# Patient Record
Sex: MALE | Race: WHITE | NOT HISPANIC OR LATINO | Employment: OTHER | URBAN - METROPOLITAN AREA
[De-identification: names, ages, dates, MRNs, and addresses within clinical notes are randomized per-mention and may not be internally consistent; named-entity substitution may affect disease eponyms.]

---

## 2024-01-30 ENCOUNTER — OFFICE VISIT (OUTPATIENT)
Dept: PRIMARY CARE | Facility: CLINIC | Age: 69
End: 2024-01-30
Payer: MEDICARE

## 2024-01-30 VITALS
HEART RATE: 61 BPM | SYSTOLIC BLOOD PRESSURE: 138 MMHG | OXYGEN SATURATION: 96 % | DIASTOLIC BLOOD PRESSURE: 70 MMHG | BODY MASS INDEX: 29.92 KG/M2 | WEIGHT: 202 LBS | HEIGHT: 69 IN | TEMPERATURE: 98 F

## 2024-01-30 DIAGNOSIS — I63.9 CEREBROVASCULAR ACCIDENT (CVA), UNSPECIFIED MECHANISM (MULTI): Primary | ICD-10-CM

## 2024-01-30 DIAGNOSIS — H35.30 MACULAR DEGENERATION, UNSPECIFIED LATERALITY, UNSPECIFIED TYPE: ICD-10-CM

## 2024-01-30 DIAGNOSIS — J43.2 CENTRILOBULAR EMPHYSEMA (MULTI): ICD-10-CM

## 2024-01-30 DIAGNOSIS — I48.91 ATRIAL FIBRILLATION, UNSPECIFIED TYPE (MULTI): ICD-10-CM

## 2024-01-30 DIAGNOSIS — H35.3230 BILATERAL EXUDATIVE AGE-RELATED MACULAR DEGENERATION, UNSPECIFIED STAGE (MULTI): ICD-10-CM

## 2024-01-30 DIAGNOSIS — C61 PROSTATE CANCER (MULTI): ICD-10-CM

## 2024-01-30 DIAGNOSIS — E66.9 OBESITY (BMI 30-39.9): ICD-10-CM

## 2024-01-30 DIAGNOSIS — E78.2 MIXED HYPERLIPIDEMIA: ICD-10-CM

## 2024-01-30 DIAGNOSIS — R30.9 URINARY PAIN: ICD-10-CM

## 2024-01-30 DIAGNOSIS — N40.1 BPH ASSOCIATED WITH NOCTURIA: ICD-10-CM

## 2024-01-30 DIAGNOSIS — K21.9 GASTROESOPHAGEAL REFLUX DISEASE WITHOUT ESOPHAGITIS: ICD-10-CM

## 2024-01-30 DIAGNOSIS — R35.1 BPH ASSOCIATED WITH NOCTURIA: ICD-10-CM

## 2024-01-30 PROCEDURE — 1036F TOBACCO NON-USER: CPT | Performed by: FAMILY MEDICINE

## 2024-01-30 PROCEDURE — 1159F MED LIST DOCD IN RCRD: CPT | Performed by: FAMILY MEDICINE

## 2024-01-30 PROCEDURE — 99213 OFFICE O/P EST LOW 20 MIN: CPT | Performed by: FAMILY MEDICINE

## 2024-01-30 RX ORDER — ATORVASTATIN CALCIUM 20 MG/1
20 TABLET, FILM COATED ORAL DAILY
COMMUNITY
Start: 2024-01-24

## 2024-01-30 RX ORDER — PHENAZOPYRIDINE HYDROCHLORIDE 100 MG/1
100 TABLET, FILM COATED ORAL 3 TIMES DAILY
COMMUNITY
Start: 2024-01-08 | End: 2024-01-30 | Stop reason: WASHOUT

## 2024-01-30 RX ORDER — NAPROXEN 500 MG/1
500 TABLET ORAL 2 TIMES DAILY PRN
Qty: 60 TABLET | Refills: 0 | Status: SHIPPED | OUTPATIENT
Start: 2024-01-30 | End: 2024-09-26

## 2024-01-30 RX ORDER — IPRATROPIUM BROMIDE AND ALBUTEROL SULFATE 2.5; .5 MG/3ML; MG/3ML
3 SOLUTION RESPIRATORY (INHALATION)
COMMUNITY
Start: 2022-10-28

## 2024-01-30 RX ORDER — POTASSIUM CHLORIDE 1500 MG/1
20 TABLET, EXTENDED RELEASE ORAL DAILY
COMMUNITY
Start: 2024-01-24

## 2024-01-30 RX ORDER — TAMSULOSIN HYDROCHLORIDE 0.4 MG/1
0.4 CAPSULE ORAL NIGHTLY
COMMUNITY
Start: 2023-10-02

## 2024-01-30 RX ORDER — BENZONATATE 100 MG/1
100 CAPSULE ORAL 3 TIMES DAILY PRN
COMMUNITY
Start: 2023-12-07 | End: 2024-01-30 | Stop reason: WASHOUT

## 2024-01-30 RX ORDER — TORSEMIDE 20 MG/1
40 TABLET ORAL
COMMUNITY
Start: 2024-01-24

## 2024-01-30 RX ORDER — PANTOPRAZOLE SODIUM 40 MG/1
40 TABLET, DELAYED RELEASE ORAL DAILY
COMMUNITY
End: 2024-01-31 | Stop reason: SDUPTHER

## 2024-01-30 RX ORDER — IPRATROPIUM BROMIDE 0.5 MG/2.5ML
SOLUTION RESPIRATORY (INHALATION)
COMMUNITY
Start: 2023-08-07

## 2024-01-30 RX ORDER — FINASTERIDE 5 MG/1
5 TABLET, FILM COATED ORAL DAILY
COMMUNITY
Start: 2024-01-24

## 2024-01-30 RX ORDER — TRAMADOL HYDROCHLORIDE 50 MG/1
50 TABLET ORAL 2 TIMES DAILY
Qty: 10 TABLET | Refills: 0 | Status: SHIPPED | OUTPATIENT
Start: 2024-01-30 | End: 2024-02-04

## 2024-01-30 RX ORDER — NAPROXEN SODIUM 220 MG/1
81 TABLET, FILM COATED ORAL DAILY
Qty: 30 TABLET | Refills: 11 | Status: SHIPPED | OUTPATIENT
Start: 2024-01-30 | End: 2025-01-29

## 2024-01-30 RX ORDER — BRIMONIDINE TARTRATE 2 MG/ML
1 SOLUTION/ DROPS OPHTHALMIC 2 TIMES DAILY
COMMUNITY
Start: 2023-03-08

## 2024-01-30 RX ORDER — ALBUTEROL SULFATE 90 UG/1
AEROSOL, METERED RESPIRATORY (INHALATION)
COMMUNITY

## 2024-01-30 RX ORDER — TRAMADOL HYDROCHLORIDE 50 MG/1
50 TABLET ORAL 2 TIMES DAILY
COMMUNITY
Start: 2023-05-11 | End: 2024-01-30 | Stop reason: SDUPTHER

## 2024-01-30 RX ORDER — ALBUTEROL SULFATE 0.83 MG/ML
SOLUTION RESPIRATORY (INHALATION)
COMMUNITY
Start: 2023-08-07

## 2024-01-30 NOTE — PROGRESS NOTES
Med refills   Establish care   Many Medical concerns   Macular degeneration     Camcevi injection q 6 months   Eligard injections q 6 months   On oxygen

## 2024-01-30 NOTE — PATIENT INSTRUCTIONS

## 2024-01-30 NOTE — PROGRESS NOTES
Patient was identified as a fall risk. Risk prevention instructions provided.    Patient is here to establish.  He did not care for his last PCP.  He had prostate cancer and has been having radiation.  Has been having pain and change in his stream.  He was given a bunch of different medications and is still frustrated.  He was given a long time ago some tramadol when it is really bad he will take 1 of those but has not really used too much as he does not like to.  He is on oxygen and otherwise doing well.    REVIEW OF SYSTEMS: 12 systems negative except for those mentioned in the HPI.    PHYSICAL EXAMINATION:   Constitutional: The patient is alert, in no acute  distress.  Eyes: Extraocular movements are intact.   ENT: external ear canals patent  Neck: no  JVD.  Heart: no JVD  Lungs: Normal respiration without stridor or nasal flaring   Psychiatric: Good judgment and insight. Normal affect and mood.  Skin: no rashes or lesions    Assessment:  per EMR    Plan:  OARRS reviewed appropriate.  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  Will go on doxycycline can also use naproxen.  Will have for breakthrough discomfort tramadol.  Will then follow-up with urology and talk about possible scope or other procedure.  Follow-up in 6 months for annual wellness    This dictation was created using Dragon dictation and may contain errors

## 2024-01-31 DIAGNOSIS — K21.9 GASTROESOPHAGEAL REFLUX DISEASE WITHOUT ESOPHAGITIS: Primary | ICD-10-CM

## 2024-01-31 RX ORDER — PANTOPRAZOLE SODIUM 40 MG/1
40 TABLET, DELAYED RELEASE ORAL DAILY
Qty: 90 TABLET | Refills: 1 | Status: SHIPPED | OUTPATIENT
Start: 2024-01-31

## 2024-06-10 ENCOUNTER — TELEPHONE (OUTPATIENT)
Dept: PRIMARY CARE | Facility: CLINIC | Age: 69
End: 2024-06-10
Payer: MEDICARE

## 2024-06-10 DIAGNOSIS — C61 PROSTATE CANCER (MULTI): Primary | ICD-10-CM

## 2024-06-10 NOTE — TELEPHONE ENCOUNTER
[facility] said they would like a hospice order faxed to them at [phone], did not see any referrals placed in chart

## 2024-06-24 ENCOUNTER — TELEPHONE (OUTPATIENT)
Dept: PRIMARY CARE | Facility: CLINIC | Age: 69
End: 2024-06-24
Payer: MEDICARE

## 2024-06-24 NOTE — TELEPHONE ENCOUNTER
Patient is asking for a refill on tramadol because he is in pain 7-8 pain scale. His surgeon will not send him anything.

## 2024-07-03 DIAGNOSIS — E78.2 MIXED HYPERLIPIDEMIA: Primary | ICD-10-CM

## 2024-07-03 RX ORDER — ATORVASTATIN CALCIUM 20 MG/1
20 TABLET, FILM COATED ORAL DAILY
Qty: 90 TABLET | Refills: 0 | Status: SHIPPED | OUTPATIENT
Start: 2024-07-03

## 2024-07-30 ENCOUNTER — APPOINTMENT (OUTPATIENT)
Dept: PRIMARY CARE | Facility: CLINIC | Age: 69
End: 2024-07-30
Payer: MEDICARE

## 2024-08-05 DIAGNOSIS — K21.9 GASTROESOPHAGEAL REFLUX DISEASE WITHOUT ESOPHAGITIS: ICD-10-CM

## 2024-08-05 RX ORDER — PANTOPRAZOLE SODIUM 40 MG/1
40 TABLET, DELAYED RELEASE ORAL DAILY
Qty: 90 TABLET | Refills: 0 | Status: SHIPPED | OUTPATIENT
Start: 2024-08-05

## 2024-09-06 ENCOUNTER — PATIENT OUTREACH (OUTPATIENT)
Dept: CARE COORDINATION | Facility: CLINIC | Age: 69
End: 2024-09-06
Payer: MEDICARE

## 2024-09-06 NOTE — PROGRESS NOTES
Date: 09/06/24    Dear Myles Nixon    Our records indicate that you are due for the following appointment or test: Annual Wellness Visit      Please call our office at your earliest convenience. We can assist you with scheduling an appointment or test.  If you have already scheduled an appointment or have completed testing, please disregard this letter.    Sincerely,    Svetlana Lee    [facility]  [address] [place] [address]    Office Phone: [phone]  Patient Navigator: [phone]

## 2024-09-09 ENCOUNTER — APPOINTMENT (OUTPATIENT)
Dept: PRIMARY CARE | Facility: CLINIC | Age: 69
End: 2024-09-09
Payer: MEDICARE

## 2024-10-01 ENCOUNTER — TELEPHONE (OUTPATIENT)
Dept: CARDIOLOGY | Facility: CLINIC | Age: 69
End: 2024-10-01
Payer: MEDICARE

## 2024-10-01 DIAGNOSIS — C61 PROSTATE CANCER (MULTI): Primary | ICD-10-CM

## 2024-10-01 DIAGNOSIS — I63.9 CEREBROVASCULAR ACCIDENT (CVA), UNSPECIFIED MECHANISM (MULTI): ICD-10-CM

## 2024-10-15 DIAGNOSIS — E78.2 MIXED HYPERLIPIDEMIA: ICD-10-CM

## 2024-10-15 RX ORDER — ATORVASTATIN CALCIUM 20 MG/1
20 TABLET, FILM COATED ORAL DAILY
Qty: 90 TABLET | Refills: 0 | Status: SHIPPED | OUTPATIENT
Start: 2024-10-15

## 2024-11-05 DIAGNOSIS — K21.9 GASTROESOPHAGEAL REFLUX DISEASE WITHOUT ESOPHAGITIS: ICD-10-CM

## 2024-11-05 RX ORDER — PANTOPRAZOLE SODIUM 40 MG/1
40 TABLET, DELAYED RELEASE ORAL DAILY
Qty: 90 TABLET | Refills: 0 | Status: SHIPPED | OUTPATIENT
Start: 2024-11-05

## 2024-12-30 LAB
NON-UH HIE ALLEN TEST: ABNORMAL
NON-UH HIE BASE EXCESS: -0.5 MMOL/L
NON-UH HIE EPAP: 0 CMH20
NON-UH HIE FIO2: 36 %
NON-UH HIE HCO3: 24.7 MMOL/L (ref 22–26)
NON-UH HIE IPAP: 0 CMH20
NON-UH HIE O2 L/M: 4
NON-UH HIE O2 SATURATION: 94.4 %
NON-UH HIE PCO2: 42.6 MMHG (ref 35–45)
NON-UH HIE PEEP: 0 CMH20
NON-UH HIE PH: 7.38 (ref 7.35–7.45)
NON-UH HIE PO2/FIO2 RATIO: 205 (ref 300–500)
NON-UH HIE PO2: 73.7 MMHG (ref 80–100)
NON-UH HIE PRESSURE SUPPORT.: 0 CMH20
NON-UH HIE RATE: 0 BPM
NON-UH HIE TEMP: 37 DEGC
NON-UH HIE TYPE OF SPECIMEN: ABNORMAL
NON-UH HIE VENTILATION: ABNORMAL
NON-UH HIE VT: 0 ML

## 2025-02-12 ENCOUNTER — LAB (OUTPATIENT)
Dept: LAB | Facility: HOSPITAL | Age: 70
End: 2025-02-12
Payer: MEDICARE

## 2025-02-12 ENCOUNTER — OFFICE VISIT (OUTPATIENT)
Dept: SURGERY | Facility: HOSPITAL | Age: 70
End: 2025-02-12
Payer: MEDICARE

## 2025-02-12 VITALS
WEIGHT: 228.3 LBS | TEMPERATURE: 97.3 F | DIASTOLIC BLOOD PRESSURE: 85 MMHG | BODY MASS INDEX: 34.21 KG/M2 | SYSTOLIC BLOOD PRESSURE: 165 MMHG | RESPIRATION RATE: 18 BRPM | OXYGEN SATURATION: 93 % | HEART RATE: 102 BPM

## 2025-02-12 DIAGNOSIS — R91.1 LUNG NODULE: ICD-10-CM

## 2025-02-12 DIAGNOSIS — Z01.818 PRE-PROCEDURAL EXAMINATION: ICD-10-CM

## 2025-02-12 LAB
ANION GAP SERPL CALC-SCNC: 11 MMOL/L (ref 10–20)
BUN SERPL-MCNC: 15 MG/DL (ref 6–23)
CALCIUM SERPL-MCNC: 9.6 MG/DL (ref 8.6–10.3)
CHLORIDE SERPL-SCNC: 100 MMOL/L (ref 98–107)
CO2 SERPL-SCNC: 34 MMOL/L (ref 21–32)
CREAT SERPL-MCNC: 0.76 MG/DL (ref 0.5–1.3)
EGFRCR SERPLBLD CKD-EPI 2021: >90 ML/MIN/1.73M*2
ERYTHROCYTE [DISTWIDTH] IN BLOOD BY AUTOMATED COUNT: 14 % (ref 11.5–14.5)
GLUCOSE SERPL-MCNC: 203 MG/DL (ref 74–99)
HCT VFR BLD AUTO: 39.4 % (ref 41–52)
HGB BLD-MCNC: 12.9 G/DL (ref 13.5–17.5)
MCH RBC QN AUTO: 29.5 PG (ref 26–34)
MCHC RBC AUTO-ENTMCNC: 32.7 G/DL (ref 32–36)
MCV RBC AUTO: 90 FL (ref 80–100)
NRBC BLD-RTO: 0 /100 WBCS (ref 0–0)
PLATELET # BLD AUTO: 239 X10*3/UL (ref 150–450)
POTASSIUM SERPL-SCNC: 3.8 MMOL/L (ref 3.5–5.3)
RBC # BLD AUTO: 4.37 X10*6/UL (ref 4.5–5.9)
SODIUM SERPL-SCNC: 141 MMOL/L (ref 136–145)
WBC # BLD AUTO: 6.3 X10*3/UL (ref 4.4–11.3)

## 2025-02-12 PROCEDURE — 80048 BASIC METABOLIC PNL TOTAL CA: CPT | Performed by: THORACIC SURGERY (CARDIOTHORACIC VASCULAR SURGERY)

## 2025-02-12 PROCEDURE — 1126F AMNT PAIN NOTED NONE PRSNT: CPT | Performed by: THORACIC SURGERY (CARDIOTHORACIC VASCULAR SURGERY)

## 2025-02-12 PROCEDURE — 99215 OFFICE O/P EST HI 40 MIN: CPT | Performed by: THORACIC SURGERY (CARDIOTHORACIC VASCULAR SURGERY)

## 2025-02-12 PROCEDURE — 36415 COLL VENOUS BLD VENIPUNCTURE: CPT | Performed by: THORACIC SURGERY (CARDIOTHORACIC VASCULAR SURGERY)

## 2025-02-12 PROCEDURE — 99205 OFFICE O/P NEW HI 60 MIN: CPT | Performed by: THORACIC SURGERY (CARDIOTHORACIC VASCULAR SURGERY)

## 2025-02-12 PROCEDURE — 85027 COMPLETE CBC AUTOMATED: CPT | Performed by: THORACIC SURGERY (CARDIOTHORACIC VASCULAR SURGERY)

## 2025-02-12 ASSESSMENT — PAIN SCALES - GENERAL: PAINLEVEL_OUTOF10: 0-NO PAIN

## 2025-02-12 NOTE — PROGRESS NOTES
Bronchoscopy Scheduling Request    Pre-bronchoscopy visit: New patient visit with Bronchoscopy group provider  Please schedule procedure: Next available    Cytology on-site:  Yes  Location:  [facility]  Performing physician:  Advanced diagnostic bronchoscopist  Referring physician:  Sincere Song MD, Eliseo Garcia DO  Indication:  PET avid 12 mm RUL nodule; severe COPD  Sedation / Anesthesia:  GA  Procedure:  TBNA, TBBx, Navigational bronchoscopy, Radial EBUS, Staging EBUS  Time:  Tier 3  Fluorscopy:   Yes  Imaging needed:  CT Rodolfo - same day as procedure  Labs:  None  Meds:  None  Special Considerations:   PET-CT in PACS by   Reviewed by:  Angel Peraza MD on 25

## 2025-02-12 NOTE — PROGRESS NOTES
HPI:   Myles Nixon is a 69 y.o.male referred with a suspicious right upper lobe lung nodule.  He is a 69-year-old male with history of severe COPD and hypoxic respiratory failure who was found to have a suspicious nodule on CAT scan.  A PET scan lit up this area but nowhere else.  The nodule has been growing since mid last year and since December.  He has no symptoms related to this nodule.    Past Medical History:   Diagnosis Date    Atrial fibrillation (Multi)     COPD (chronic obstructive pulmonary disease) (Multi)     CVA (cerebral vascular accident) (Multi)     On home O2     Personal history of other diseases of the digestive system     History of gastroesophageal reflux (GERD)    Personal history of other diseases of the nervous system and sense organs     History of macular degeneration    Prostate cancer (Multi)           Current Outpatient Medications:     acetaminophen (Tylenol) 650 mg suppository, unwrap and insert 1 suppository into rectum every 4 hours as needed for pain or fever, Disp: , Rfl:     albuterol 2.5 mg /3 mL (0.083 %) nebulizer solution, USE 3 ML VIA NEBULIZER EVERY 8 HOURS AS NEEDED, Disp: , Rfl:     albuterol 90 mcg/actuation inhaler, INHALE 2 PUFFS AS NEEDED EVERY 6 HOURS., Disp: , Rfl:     atorvastatin (Lipitor) 20 mg tablet, Take 1 tablet (20 mg) by mouth once daily., Disp: 90 tablet, Rfl: 0    brimonidine (AlphaGAN) 0.2 % ophthalmic solution, Administer 1 drop into both eyes 2 times a day., Disp: , Rfl:     budesonide/glycopyr/formoterol (BREZTRI AEROSPHERE INHL), Inhale 2 puffs 2 times a day., Disp: , Rfl:     finasteride (Proscar) 5 mg tablet, Take 1 tablet (5 mg) by mouth once daily., Disp: , Rfl:     hyoscyamine 0.125 mg SL tablet, Dissolve1 TABLET UNDER THE TONGUE EVERY 4 HOURS AS NEEDED for increased secretions, Disp: , Rfl:     ipratropium (Atrovent) 0.02 % nebulizer solution, INHALE THE CONTENTS OF 1 VIAL VIA NEBUILZER EVERY 8 HOURS., Disp: , Rfl:      ipratropium-albuteroL (Duo-Neb) 0.5-2.5 mg/3 mL nebulizer solution, 3 mL., Disp: , Rfl:     ketorolac (Toradol) 10 mg tablet, Take 1 tablet (10 mg) by mouth every 6 hours if needed for pain., Disp: , Rfl:     LORazepam (Ativan) 0.5 mg tablet, TAKE ONE TABLET BY MOUTH OR UNDER THE TONGUE EVERY 4 HOURS AS NEEDED, Disp: , Rfl:     morphine 20 mg/mL concentrated oral solution, GIVE 0.5ML BY MOUTH OR UNDER THE TONGUE EVERY 2 HOURS AS NEEDED FOR MODERATE TO SEVERE PAIN, DYSPNEA, OR AIR HUNGER., Disp: , Rfl:     pantoprazole (ProtoNix) 40 mg EC tablet, Take 1 tablet (40 mg) by mouth once daily., Disp: 90 tablet, Rfl: 0    potassium chloride CR 20 mEq ER tablet, Take 1 tablet (20 mEq) by mouth once daily., Disp: , Rfl:     promethazine (Phenergan) 25 mg tablet, GIVE ONE TABLET BY MOUTH OR RECTALLY EVERY 4 HOURS AS NEEDED FOR NAUSEA OR vomiting, Disp: , Rfl:     tamsulosin (Flomax) 0.4 mg 24 hr capsule, Take 1 capsule (0.4 mg) by mouth once daily at bedtime., Disp: , Rfl:     torsemide (Demadex) 20 mg tablet, Take 2 tablets (40 mg) by mouth once daily in the morning. Take before meals., Disp: , Rfl:     PSHx:  SHx:  ex smoker  denies ETOH, or illicit drugs   FMHx: negative for history of thoracic cancer     ROS  General: negative for fever, chills, weight loss, night sweat  Head: negative for severe headache, vision change, blurred vision,   CV: negative for chest pain, dizziness, lightheadedness   Pulm: negative for shortness of breath, dyspnea on exertion, hemoptysis  GI: negative for diarrhea, constipation, abdominal pain, nausea or vomiting, BRBPR  : negative for dysuria, hematuria, incontinence  Skin: negative for rash  Heme: negative for blood thinner, bleeding disorder or clotting disorder  Endo: negative for heat or cold intolerance, weight gain or weight loss  MSK: negative for rash, edema, weakness    PHYSICAL EXAM  Constitution: well-developed well-nourished in no acute distress  HEENT: NCAT, moist mucosal  membrane, neck supple, no crepitus, sclera anicteric  Lymph nodes: no cervical or supraclavicular lymphadenopathy  Cardiac: RRR, normal S1, S2, no mrg  Pulmonary: normal air movement, CTAP, no wcr  Abdomen: soft, non-distended, non-tender, no rigidity, guarding or rebound tenderness, no splenohepatomegaly  Neuro: AOx3, CNII-XII grossly normal  Ext: warm, dry, no edema noted  Skin: dry, clean and intact  Psych: mood and affect wnl    Results    I reviewed the PFT  It showed FEV1 24 and 26% percent predicted and DLCO 21 percent predicted   I reviewed the CT scan from January 17, 2025 and enlarging 1.2 cm right upper lobe nodule.   I reviewed the PET scan from 1/28/2025. It showed activity in this nodule but nowhere else      Assessment and Plan:    This is a 69-year-old male with end-stage COPD and a nodule suspicious for a clinical stage I lung cancer.  His pulmonary function is very poor and the margins obtained by wedge resection may not be adequate.  I have recommended navigation bronchoscopy for biopsies and stereotactic radiation as the best means of treatment.  The patient understands and all of his questions were answered.        Sincere Song MD  Chief Division of Thoracic and Esophageal Surgery    [facility]   Co-Director, Thoracic Oncology Program    Former[facility]  Professor of Surgery    [employer]  Office phone: [phone]  Fax: [phone]

## 2025-02-12 NOTE — PATIENT INSTRUCTIONS
Dr. Song wants you to get a Navigational Bronch Biopsy/EBUS Endobronchial Ultrasound    You will get a call in the next few days from  Interventional Pulmonary to schedule your procedure.  They will provide you with location, times and detailed instructions for procedure.      This procedure is done at  [facility].  This is typically an outpatient procedure. You will need someone to drive you home.     Before the Test:  []You will need to have blood work done at least 30 days prior to the procedure.  You can get this done today or at any  lab at your convenience.     ? If you take medications containing Aspirin, Plavix, Eliquis or Coumadin, you MUST ask your doctor when you should stop taking them. You may need to stop taking the medicine up to seven (7) days prior to the test.      Results:  Results can take up to 7-10 business days.  Dr. Song will call you to discuss the results and next steps.    Call our office if you have not received a call to schedule or with any questions.  [phone]    Dr. Song would like you to follow up with radiation oncology for focused radiation. Someone will contact you to schedule an appointment.

## 2025-02-18 ENCOUNTER — TELEMEDICINE (OUTPATIENT)
Dept: PULMONOLOGY | Facility: CLINIC | Age: 70
End: 2025-02-18
Payer: MEDICARE

## 2025-02-18 DIAGNOSIS — R91.1 RIGHT UPPER LOBE PULMONARY NODULE: ICD-10-CM

## 2025-02-18 DIAGNOSIS — Z01.811 PREOP PULMONARY/RESPIRATORY EXAM: Primary | ICD-10-CM

## 2025-02-18 PROCEDURE — 1159F MED LIST DOCD IN RCRD: CPT | Performed by: INTERNAL MEDICINE

## 2025-02-18 PROCEDURE — 1036F TOBACCO NON-USER: CPT | Performed by: INTERNAL MEDICINE

## 2025-02-18 PROCEDURE — 99213 OFFICE O/P EST LOW 20 MIN: CPT | Performed by: INTERNAL MEDICINE

## 2025-02-18 RX ORDER — CYCLOBENZAPRINE HCL 10 MG
1 TABLET ORAL
COMMUNITY
Start: 2025-01-06

## 2025-02-18 RX ORDER — TRAMADOL HYDROCHLORIDE 50 MG/1
TABLET ORAL
COMMUNITY

## 2025-02-18 NOTE — H&P (VIEW-ONLY)
Subjective   Patient ID: Myles Nixon is a 70 y.o. male who presents for pre bronchoscopy.  HPI  Patient was contacted today by telephone and his home.  I reviewed with him the schedule for his bronchoscopy which will be tomorrow 2/19/2025 at [facility].  The patient is to go to the [facility] for x-ray imaging at 7:00 tomorrow morning.  The report on his PET scan of his chest done on 1/28/2025 shows a 13 mm spiculated nodule in the right upper lobe that shows a maximum SUV of 4.1.  Patient will then proceed to the bronchoscopy suite which is at [address] and that is scheduled for 7:30 AM.  I reviewed with the patient whether he currently has a sore throat, hemoptysis, fever or anginal pain.  He denies having any congestive heart failure and does have COPD that he was diagnosed with about 3 years ago.  Patient is taking Breztri at 2 puffs twice a day and Atrovent nebulized solution every 4 hours as needed shortness of breath.  He also has DuoNeb solution.  Patient relates a history of 2-1/2 pack/day smoking history from 19 69-20 15.  He denies any alcohol abuse.  I answered all patient's questions that he had to his satisfaction.  The CT imaging was done at [facility].  Review of Systems  No change  Objective   Physical Exam  No change  Assessment/Plan        1.  Preop pulmonary/respiratory exam  2.  Pulmonary nodule in the right upper lobe    This note was transcribed using the Dragon Dictation system.  There may be grammatical, punctuation, or verbiage errors that occur with voice recognition programs.  David Momin,  02/18/25 11:49 AM

## 2025-02-19 ENCOUNTER — ANESTHESIA EVENT (OUTPATIENT)
Dept: GASTROENTEROLOGY | Facility: HOSPITAL | Age: 70
End: 2025-02-19
Payer: MEDICARE

## 2025-02-19 ENCOUNTER — TELEPHONE (OUTPATIENT)
Dept: RADIATION ONCOLOGY | Facility: HOSPITAL | Age: 70
End: 2025-02-19

## 2025-02-19 ENCOUNTER — HOSPITAL ENCOUNTER (OUTPATIENT)
Dept: GASTROENTEROLOGY | Facility: HOSPITAL | Age: 70
Discharge: HOME | End: 2025-02-19
Payer: MEDICARE

## 2025-02-19 ENCOUNTER — ANESTHESIA (OUTPATIENT)
Dept: GASTROENTEROLOGY | Facility: HOSPITAL | Age: 70
End: 2025-02-19
Payer: MEDICARE

## 2025-02-19 ENCOUNTER — HOSPITAL ENCOUNTER (OUTPATIENT)
Dept: RADIOLOGY | Facility: HOSPITAL | Age: 70
Discharge: HOME | End: 2025-02-19
Payer: MEDICARE

## 2025-02-19 VITALS
HEART RATE: 74 BPM | BODY MASS INDEX: 30.64 KG/M2 | SYSTOLIC BLOOD PRESSURE: 150 MMHG | HEIGHT: 70 IN | DIASTOLIC BLOOD PRESSURE: 83 MMHG | OXYGEN SATURATION: 99 % | WEIGHT: 214 LBS | RESPIRATION RATE: 17 BRPM | TEMPERATURE: 97.6 F

## 2025-02-19 DIAGNOSIS — R91.1 LUNG NODULE: ICD-10-CM

## 2025-02-19 PROCEDURE — 7100000001 HC RECOVERY ROOM TIME - INITIAL BASE CHARGE

## 2025-02-19 PROCEDURE — 99100 ANES PT EXTEME AGE<1 YR&>70: CPT | Performed by: ANESTHESIOLOGY

## 2025-02-19 PROCEDURE — 31623 DX BRONCHOSCOPE/BRUSH: CPT | Performed by: INTERNAL MEDICINE

## 2025-02-19 PROCEDURE — 71250 CT THORAX DX C-: CPT

## 2025-02-19 PROCEDURE — 7100000010 HC PHASE TWO TIME - EACH INCREMENTAL 1 MINUTE

## 2025-02-19 PROCEDURE — A31653 PR BRNCHSC EBUS GUIDED SAMPL 3/> NODE STATION/STRUX: Performed by: ANESTHESIOLOGY

## 2025-02-19 PROCEDURE — 31629 BRONCHOSCOPY/NEEDLE BX EACH: CPT | Performed by: INTERNAL MEDICINE

## 2025-02-19 PROCEDURE — 2500000004 HC RX 250 GENERAL PHARMACY W/ HCPCS (ALT 636 FOR OP/ED)

## 2025-02-19 PROCEDURE — 31628 BRONCHOSCOPY/LUNG BX EACH: CPT | Performed by: INTERNAL MEDICINE

## 2025-02-19 PROCEDURE — 3700000002 HC GENERAL ANESTHESIA TIME - EACH INCREMENTAL 1 MINUTE

## 2025-02-19 PROCEDURE — 2720000007 HC OR 272 NO HCPCS

## 2025-02-19 PROCEDURE — 7100000009 HC PHASE TWO TIME - INITIAL BASE CHARGE

## 2025-02-19 PROCEDURE — 31627 NAVIGATIONAL BRONCHOSCOPY: CPT | Performed by: INTERNAL MEDICINE

## 2025-02-19 PROCEDURE — 31654 BRONCH EBUS IVNTJ PERPH LES: CPT | Performed by: INTERNAL MEDICINE

## 2025-02-19 PROCEDURE — 7100000002 HC RECOVERY ROOM TIME - EACH INCREMENTAL 1 MINUTE

## 2025-02-19 PROCEDURE — C1819 TISSUE LOCALIZATION-EXCISION: HCPCS

## 2025-02-19 PROCEDURE — 3700000001 HC GENERAL ANESTHESIA TIME - INITIAL BASE CHARGE

## 2025-02-19 PROCEDURE — 31653 BRONCH EBUS SAMPLNG 3/> NODE: CPT | Performed by: INTERNAL MEDICINE

## 2025-02-19 PROCEDURE — A31653 PR BRNCHSC EBUS GUIDED SAMPL 3/> NODE STATION/STRUX

## 2025-02-19 PROCEDURE — 71250 CT THORAX DX C-: CPT | Performed by: RADIOLOGY

## 2025-02-19 RX ORDER — ONDANSETRON HYDROCHLORIDE 2 MG/ML
4 INJECTION, SOLUTION INTRAVENOUS ONCE AS NEEDED
OUTPATIENT
Start: 2025-02-19

## 2025-02-19 RX ORDER — HYDROMORPHONE HYDROCHLORIDE 1 MG/ML
0.5 INJECTION, SOLUTION INTRAMUSCULAR; INTRAVENOUS; SUBCUTANEOUS EVERY 5 MIN PRN
OUTPATIENT
Start: 2025-02-19

## 2025-02-19 RX ORDER — ONDANSETRON HYDROCHLORIDE 2 MG/ML
INJECTION, SOLUTION INTRAVENOUS AS NEEDED
Status: DISCONTINUED | OUTPATIENT
Start: 2025-02-19 | End: 2025-02-19

## 2025-02-19 RX ORDER — PROPOFOL 10 MG/ML
INJECTION, EMULSION INTRAVENOUS AS NEEDED
Status: DISCONTINUED | OUTPATIENT
Start: 2025-02-19 | End: 2025-02-19

## 2025-02-19 RX ORDER — FENTANYL CITRATE 50 UG/ML
50 INJECTION, SOLUTION INTRAMUSCULAR; INTRAVENOUS EVERY 5 MIN PRN
OUTPATIENT
Start: 2025-02-19

## 2025-02-19 RX ORDER — LIDOCAINE HYDROCHLORIDE 20 MG/ML
INJECTION, SOLUTION INFILTRATION; PERINEURAL AS NEEDED
Status: DISCONTINUED | OUTPATIENT
Start: 2025-02-19 | End: 2025-02-19

## 2025-02-19 RX ORDER — OXYCODONE HYDROCHLORIDE 5 MG/1
5 TABLET ORAL EVERY 4 HOURS PRN
OUTPATIENT
Start: 2025-02-19

## 2025-02-19 RX ORDER — PHENYLEPHRINE HCL IN 0.9% NACL 0.4MG/10ML
SYRINGE (ML) INTRAVENOUS AS NEEDED
Status: DISCONTINUED | OUTPATIENT
Start: 2025-02-19 | End: 2025-02-19

## 2025-02-19 RX ORDER — ROCURONIUM BROMIDE 10 MG/ML
INJECTION, SOLUTION INTRAVENOUS AS NEEDED
Status: DISCONTINUED | OUTPATIENT
Start: 2025-02-19 | End: 2025-02-19

## 2025-02-19 RX ORDER — HYDRALAZINE HYDROCHLORIDE 20 MG/ML
5 INJECTION INTRAMUSCULAR; INTRAVENOUS EVERY 30 MIN PRN
OUTPATIENT
Start: 2025-02-19

## 2025-02-19 RX ORDER — LIDOCAINE HYDROCHLORIDE 10 MG/ML
0.1 INJECTION, SOLUTION EPIDURAL; INFILTRATION; INTRACAUDAL; PERINEURAL ONCE
OUTPATIENT
Start: 2025-02-19 | End: 2025-02-19

## 2025-02-19 RX ORDER — LABETALOL HYDROCHLORIDE 5 MG/ML
5 INJECTION, SOLUTION INTRAVENOUS ONCE AS NEEDED
OUTPATIENT
Start: 2025-02-19

## 2025-02-19 RX ORDER — FENTANYL CITRATE 50 UG/ML
25 INJECTION, SOLUTION INTRAMUSCULAR; INTRAVENOUS EVERY 5 MIN PRN
OUTPATIENT
Start: 2025-02-19

## 2025-02-19 RX ADMIN — DEXAMETHASONE SODIUM PHOSPHATE 4 MG: 4 INJECTION INTRA-ARTICULAR; INTRALESIONAL; INTRAMUSCULAR; INTRAVENOUS; SOFT TISSUE at 09:21

## 2025-02-19 RX ADMIN — PROPOFOL 30 MG: 10 INJECTION, EMULSION INTRAVENOUS at 10:03

## 2025-02-19 RX ADMIN — PROPOFOL 150 MG: 10 INJECTION, EMULSION INTRAVENOUS at 09:13

## 2025-02-19 RX ADMIN — PROPOFOL 30 MG: 10 INJECTION, EMULSION INTRAVENOUS at 09:16

## 2025-02-19 RX ADMIN — ROCURONIUM 70 MG: 50 INJECTION, SOLUTION INTRAVENOUS at 09:14

## 2025-02-19 RX ADMIN — PROPOFOL 100 MCG/KG/MIN: 10 INJECTION, EMULSION INTRAVENOUS at 09:14

## 2025-02-19 RX ADMIN — Medication 80 MCG: at 09:32

## 2025-02-19 RX ADMIN — PROPOFOL 30 MG: 10 INJECTION, EMULSION INTRAVENOUS at 10:30

## 2025-02-19 RX ADMIN — SUGAMMADEX 200 MG: 100 INJECTION, SOLUTION INTRAVENOUS at 11:27

## 2025-02-19 RX ADMIN — ROCURONIUM 10 MG: 50 INJECTION, SOLUTION INTRAVENOUS at 11:13

## 2025-02-19 RX ADMIN — SODIUM CHLORIDE, SODIUM LACTATE, POTASSIUM CHLORIDE, AND CALCIUM CHLORIDE: 600; 310; 30; 20 INJECTION, SOLUTION INTRAVENOUS at 09:13

## 2025-02-19 RX ADMIN — ROCURONIUM 30 MG: 50 INJECTION, SOLUTION INTRAVENOUS at 10:30

## 2025-02-19 RX ADMIN — Medication 120 MCG: at 10:35

## 2025-02-19 RX ADMIN — ONDANSETRON 4 MG: 2 INJECTION, SOLUTION INTRAMUSCULAR; INTRAVENOUS at 09:21

## 2025-02-19 RX ADMIN — Medication 80 MCG: at 10:06

## 2025-02-19 RX ADMIN — LIDOCAINE HYDROCHLORIDE 100 MG: 20 INJECTION, SOLUTION INFILTRATION; PERINEURAL at 09:13

## 2025-02-19 ASSESSMENT — PAIN - FUNCTIONAL ASSESSMENT
PAIN_FUNCTIONAL_ASSESSMENT: 0-10

## 2025-02-19 ASSESSMENT — PAIN SCALES - GENERAL
PAINLEVEL_OUTOF10: 0 - NO PAIN

## 2025-02-19 ASSESSMENT — COLUMBIA-SUICIDE SEVERITY RATING SCALE - C-SSRS
6. HAVE YOU EVER DONE ANYTHING, STARTED TO DO ANYTHING, OR PREPARED TO DO ANYTHING TO END YOUR LIFE?: NO
2. HAVE YOU ACTUALLY HAD ANY THOUGHTS OF KILLING YOURSELF?: NO
1. IN THE PAST MONTH, HAVE YOU WISHED YOU WERE DEAD OR WISHED YOU COULD GO TO SLEEP AND NOT WAKE UP?: NO

## 2025-02-19 NOTE — SIGNIFICANT EVENT
Dr Alvarez at bedside reviewed procedure with family and patient and reviewed care plan within discharge summary.

## 2025-02-19 NOTE — ANESTHESIA PREPROCEDURE EVALUATION
Patient: Myles Nixon    Procedure Information       Date/Time: 02/19/25 0830    Scheduled providers: Mateo Alvarez MD    Procedure: BRONCHOSCOPY    Location: [facility]          69M w/ RUL nodule, severe COPD on home O2, AFIB, CVA, GERD, Prostate cancer presenting for procedure listed above    Relevant Problems   Cardiac   (+) Atrial fibrillation, unspecified type (Multi)   (+) Mixed hyperlipidemia      Pulmonary   (+) Centrilobular emphysema (Multi)      Neuro   (+) Cerebrovascular accident (CVA) (Multi)      GI   (+) Gastroesophageal reflux disease without esophagitis      /Renal   (+) Prostate cancer (Multi)       Clinical information reviewed:    Allergies                NPO Detail:  NPO/Void Status  Carbohydrate Drink Given Prior to Surgery? : N  Date of Last Liquid: 02/19/25  Time of Last Liquid: 0330  Date of Last Solid: 02/18/25  Time of Last Solid: 1545  Last Intake Type: Light meal  Time of Last Void: 0740         Physical Exam    Airway  Mallampati: I  TM distance: >3 FB  Neck ROM: full     Cardiovascular - normal exam     Dental   (+) lower dentures, upper dentures     Pulmonary - normal exam     Abdominal - normal exam             Anesthesia Plan    History of general anesthesia?: yes  History of complications of general anesthesia?: no    ASA 4     general     intravenous induction   Postoperative administration of opioids is intended.  Trial extubation is planned.  Anesthetic plan and risks discussed with patient.  Use of blood products discussed with patient who consented to blood products.    Plan discussed with CAA, CRNA and attending.

## 2025-02-19 NOTE — DISCHARGE INSTRUCTIONS
The anesthetics, sedatives or narcotics which were given to you today will be acting in your body for the next 24 hours, so you might feel a little sleepy or groggy. This feeling should slowly wear off.   Carefully read and follow the instructions below:   You received sedation today.   Do not drive or operate machinery or power tools of any kind.   No alcoholic beverages today, not even beer or wine.   No over the counter medications that contain alcohol or may cause drowsiness.   Do not make important decisions or sign legal documents.     Do not use Aspirin containing products or non-steroidal medications for the next 24 hours.  (Examples of these types of medications include: Advil, Aleve, Ecotrin, Ibuprofen, Motrin or Naprosyn.  This list is not all-inclusive.  Check with your physician or pharmacist before resuming these medications.)  Tylenol, cough medicine, cough drops or throat lozenges may be used when you are allowed to resume eating and drinking.     Call your physician if any of these symptoms occur:   High fever over 101 degrees or chills (a low grade fever is common for 24 hours)   Rash or hives   Persistent nausea or vomiting   Inability to urinate within 8 hours after the procedure  Go directly to the emergency room if you notice any of the following:   Shortness of breath   Chest pain  Coughing up large amounts of bright red blood greater than a teaspoonful of blood clots (about a teaspoonful for the next 24-48 hours is normal, especially if you had a biopsy)  Resume all normal medications unless directed otherwise by your doctor.       Follow up with your referring physician as previously scheduled.    If you experience any problems or have any questions following discharge, please call:   Before 5 pm: [phone]   After 5pm and on weekends: [phone] / [phone] and ask for the Pulmonary Fellow on-call (Pager Number: [phone])

## 2025-02-19 NOTE — ANESTHESIA POSTPROCEDURE EVALUATION
Patient: Myles Nixon    Procedure Summary       Date: 02/19/25 Room / Location: [facility]    Anesthesia Start: 0906 Anesthesia Stop: 1145    Procedure: BRONCHOSCOPY Diagnosis: Lung nodule    Scheduled Providers: Mateo Alvarez MD Responsible Provider: Otto Germain MD    Anesthesia Type: general ASA Status: 4            Anesthesia Type: general    Vitals Value Taken Time   /90 02/19/25 1203   Temp 36.4 °C (97.6 °F) 02/19/25 1140   Pulse 81 02/19/25 1203   Resp 17 02/19/25 1203   SpO2 97 % 02/19/25 1203       Anesthesia Post Evaluation    Patient location during evaluation: PACU  Patient participation: complete - patient participated  Level of consciousness: sleepy but conscious  Pain management: adequate  Airway patency: patent  Cardiovascular status: acceptable  Respiratory status: acceptable and nasal cannula (On Home O2)  Hydration status: acceptable  Postoperative Nausea and Vomiting: none        No notable events documented.

## 2025-02-19 NOTE — ANESTHESIA PROCEDURE NOTES
Airway  Date/Time: 2/19/2025 9:16 AM  Urgency: elective    Airway not difficult    Staffing  Performed: CRNA   Authorized by: Otto Germain MD    Performed by: RAFAT Dugan-MARITA  Patient location during procedure: OR    Indications and Patient Condition  Indications for airway management: anesthesia  Spontaneous Ventilation: absent  Sedation level: deep  Preoxygenated: yes  Patient position: sniffing  Mask difficulty assessment: 1 - vent by mask  Planned trial extubation    Final Airway Details  Final airway type: endotracheal airway      Successful airway: ETT  Cuffed: yes   Successful intubation technique: direct laryngoscopy  Facilitating devices/methods: intubating stylet  Endotracheal tube insertion site: oral  Blade: Ludwig  ETT size (mm): 8.5  Cormack-Lehane Classification: grade I - full view of glottis  Placement verified by: capnometry   Cuff volume (mL): 10  Measured from: lips  ETT to lips (cm): 22

## 2025-02-19 NOTE — LETTER
Dear, Myles Nixon       2/17/2025                                                                                                     INFORMATION FOR YOUR PROCEDURE    INSTRUCTIONS MUST BE FOLLOWED OR YOU RUN THE RISK OF YOUR CASE BEING CANCELED    Information is attached to this e-mail for your upcoming procedure. (Look for the paperclip in your email to access this information)  Lab requisitions (if needed), are also included as well as procedural instructions.       You are scheduled for your Bronchoscopy on 2/19/25  , with Dr. Galindo [facility] [address] [place] [address]    07:00 AM    - CT  Scan  [facility]   2nd Floor Radiology  [address]    When finished with the CT scan,   please make your way to [facility] Room [address].  This is right around the corner from [facility].  Located on the first floor.  There are information desks there for your convenience and if you have questions.    Check In will be at  7:30  AM.  This allows us to prepare you for the actual procedure.                                        Bronchoscopy   Location:  [address] [facility]                                         Bronchoscopy / Endoscopy Suite    NPO (No food or drink) after midnight the night before your procedure. This includes coffee, water, and soda, hard candy, gum or mints.  If taking your morning medications, a small sip of water is allowed to get the medication down.    For your safety, you must have a responsible, adult  accompany you to your procedure.  You will not be permitted to drive yourself home if you have received any type of anesthesia or sedation.    Automated calls about your upcoming scheduled appointments can be quite confusing for patients.    The times may vary depending on what you have scheduled for procedure day. Follow the time/s I have given you on your information sheet so there is no confusion.  Be aware you may  receive conflicting times from different departments.      Blood work will need to be done prior to your procedure, preferably at a  Facility.  There are no restrictions for testing. Your blood work is current.  No need to repeat.    Dr. David Momin, will call you on 2/18/25, @ 1:00 PM    This is a phone visit to go over your medical history prior to your procedure, and is a necessary part of your medical workup.  The patient must be present at this visit.    Please reach out to me with any questions you may have  Barbara  [phone] or Jorge @ [phone]    If you have an emergency (weather or other) on the day of your scheduled procedure and need to cancel for any reason, Please call the Endoscopy Suite @ [phone],  Otherwise, call Barbara @ [phone]      Have a nice day!    Barbara Kat    Bronchoscopy   Interventional Pulmonology    MD Tone De La Cruz MD Sameer Avasarala, MD Catalina Teba, MD Andrew Dunatchik, MD Sruti Brahmandam, MD      [facility]  Pulmonary, Critical Care and Sleep Medicine  [address] [place] [address]  P -[phone]  - [phone]  [email or web address]

## 2025-02-20 ENCOUNTER — HOSPITAL ENCOUNTER (OUTPATIENT)
Dept: RADIATION ONCOLOGY | Facility: HOSPITAL | Age: 70
Setting detail: RADIATION/ONCOLOGY SERIES
Discharge: HOME | End: 2025-02-20
Payer: MEDICARE

## 2025-02-20 VITALS
BODY MASS INDEX: 32.42 KG/M2 | RESPIRATION RATE: 18 BRPM | DIASTOLIC BLOOD PRESSURE: 79 MMHG | SYSTOLIC BLOOD PRESSURE: 145 MMHG | HEART RATE: 111 BPM | WEIGHT: 225.97 LBS | OXYGEN SATURATION: 94 % | TEMPERATURE: 97.9 F

## 2025-02-20 DIAGNOSIS — R91.1 LUNG NODULE: ICD-10-CM

## 2025-02-20 DIAGNOSIS — Z01.818 PRE-PROCEDURAL EXAMINATION: ICD-10-CM

## 2025-02-20 DIAGNOSIS — C34.91: Primary | ICD-10-CM

## 2025-02-20 PROCEDURE — 99215 OFFICE O/P EST HI 40 MIN: CPT | Performed by: RADIOLOGY

## 2025-02-20 PROCEDURE — G2211 COMPLEX E/M VISIT ADD ON: HCPCS | Performed by: RADIOLOGY

## 2025-02-20 PROCEDURE — 99205 OFFICE O/P NEW HI 60 MIN: CPT | Performed by: RADIOLOGY

## 2025-02-20 ASSESSMENT — ENCOUNTER SYMPTOMS
GASTROINTESTINAL NEGATIVE: 1
COUGH: 1
MUSCULOSKELETAL NEGATIVE: 1
HEMOPTYSIS: 1
NEUROLOGICAL NEGATIVE: 1
HEMATOLOGIC/LYMPHATIC NEGATIVE: 1
PSYCHIATRIC NEGATIVE: 1
CARDIOVASCULAR NEGATIVE: 1
EYES NEGATIVE: 1
DIFFICULTY URINATING: 1
SHORTNESS OF BREATH: 1
CONSTITUTIONAL NEGATIVE: 1
CHEST TIGHTNESS: 1
HOT FLASHES: 1

## 2025-02-20 ASSESSMENT — PATIENT HEALTH QUESTIONNAIRE - PHQ9
2. FEELING DOWN, DEPRESSED OR HOPELESS: NOT AT ALL
SUM OF ALL RESPONSES TO PHQ9 QUESTIONS 1 AND 2: 0
1. LITTLE INTEREST OR PLEASURE IN DOING THINGS: NOT AT ALL

## 2025-02-20 ASSESSMENT — COLUMBIA-SUICIDE SEVERITY RATING SCALE - C-SSRS
6. HAVE YOU EVER DONE ANYTHING, STARTED TO DO ANYTHING, OR PREPARED TO DO ANYTHING TO END YOUR LIFE?: NO
1. IN THE PAST MONTH, HAVE YOU WISHED YOU WERE DEAD OR WISHED YOU COULD GO TO SLEEP AND NOT WAKE UP?: NO
2. HAVE YOU ACTUALLY HAD ANY THOUGHTS OF KILLING YOURSELF?: NO

## 2025-02-20 ASSESSMENT — PAIN SCALES - GENERAL: PAINLEVEL_OUTOF10: 6

## 2025-02-20 NOTE — PROGRESS NOTES
Radiation Oncology Nursing Note    Prior Radiotherapy:  Yes, describe: had RT in 2023 for prostate cancer  No radiation treatments to show. (Treatments may have been administered in another system.)     Current Systemic Treatment:  No     Presence of Pacemaker or ICD:  No    History of Autoimmune or Connective Tissue Disorders:  No    Pain: The patient's current pain level was assessed.  They report currently having a pain of 0 out of 10.  They feel their pain is under control with the use of pain medications.    Review of Systems:  Review of Systems   Constitutional: Negative.    HENT:  Negative.     Eyes: Negative.    Respiratory:  Positive for chest tightness, cough (worse over last few months; productive of clear phlegm), hemoptysis (only from bronch yesterday) and shortness of breath (with minimal exertion).         Pt. Has been on O2 for last 8 years; was on 2-4L until about 2 months ago, when increased to 6L while resting and 8L with activity; pain to right chest below right breast   Cardiovascular: Negative.    Gastrointestinal: Negative.    Endocrine: Positive for hot flashes.   Genitourinary:  Positive for difficulty urinating (has to strain to urinate).    Musculoskeletal: Negative.    Skin: Negative.    Neurological: Negative.    Hematological: Negative.    Psychiatric/Behavioral: Negative.        Patient here with his son Greg.  Patient has growing RUL nodule that he was told is probably cancer.  Pt. had bronch yesterday.  Patient with a hx of COPD for years.  Patient also with a history of prostate cancer in 2023, treated with RT 3xwk for 9 wks at [facility].  Patient on chronic O2 for the last 8 years, and fully functions  but gets SOB with minimal exertion.

## 2025-02-20 NOTE — ADDENDUM NOTE
Encounter addended by: Leo Ramirez RN on: 2/20/2025 8:25 AM   Actions taken: Contacts section saved, Flowsheet accepted

## 2025-02-20 NOTE — PROGRESS NOTES
Radiation Oncology Outpatient Consult    Patient Name:  Myles Nixon  MRN:  [id number]  :  1955    Referring Provider: Sincere Song MD  Primary Care Provider: Eliseo Garcia DO  Care Team: Patient Care Team:  Eliseo Garcia DO as PCP - General (Family Medicine)    Date of Service: 2025     SUBJECTIVE  History of Present Illness:  Myles Nixon is a 70 y.o. male who was referred by Sincere Song MD, for a consultation to the [facility] Department of Radiation Oncology.     70-year-old male with a suspicious right upper lobe lung nodule. He was found to have a suspicious right upper lobe spiculated nodule on CT scan growing in size 1.2 x 1.2 cm as compared to the previous scan 0.9 x 1.1 cm on 2024, initially unchanged when compared with CT chest done on 6/3/2024. Consequently, he had a PET scan redemonstrating the mass with max SUV of 4.1.    PET scan 2025  The peripheral right upper lobe spiculated nodule (13 mm) demonstrates mild to moderate focal hypermetabolism with max SUV of 4.1, raising concern for primary pulmonary malignancy. Tissue sampling is advised. No evidence of FDG avid noni or distant metastatic disease.  Bronchoscopy was done yesterday 2025, pathology is pending    Cough  Hemoptysis  Dysphagia  Shortness of breath  Chest pain  Fever  Weight loss    CT Chest 2025    1. A 1.4 cm spiculated solid right upper lobe pulmonary nodule is  noted concerning for pulmonary neoplasm. Further evaluation with  tissue sampling would be recommended.  2. A 0.6 cm solid right middle lobe pulmonary nodule is likely benign  but further follow-up with CT chest without contrast can be  considered based on clinical concern.  3. Severe coronary artery calcifications.  4. Additional incidental findings as described above.    Prior Radiotherapy:  No radiation treatments to show. (Treatments may have been administered in another system.)      Past Medical History:    Past Medical History:   Diagnosis Date    Atrial fibrillation (Multi)     COPD (chronic obstructive pulmonary disease) (Multi)     CVA (cerebral vascular accident) (Multi)     High cholesterol     On home O2     Personal history of other diseases of the digestive system     History of gastroesophageal reflux (GERD)    Personal history of other diseases of the nervous system and sense organs     History of macular degeneration    Prostate cancer (Multi)         Past Surgical History:    Past Surgical History:   Procedure Laterality Date    OTHER SURGICAL HISTORY  2019    Biceps tendon rupture repair    OTHER SURGICAL HISTORY  2019    Leg surgery    OTHER SURGICAL HISTORY  2019    Cholecystectomy laparoscopic      Family History:  Cancer-related family history is not on file.    Social History:    Social History     Tobacco Use    Smoking status: Former     Current packs/day: 0.00     Average packs/day: 2.5 packs/day for 48.0 years (120.0 ttl pk-yrs)     Types: Cigarettes     Start date:      Quit date:      Years since quittin.1    Smokeless tobacco: Never   Vaping Use    Vaping status: Never Used   Substance Use Topics    Alcohol use: Not Currently     Comment: 1 beer on ocasion    Drug use: Never     Allergies:    Allergies   Allergen Reactions    Budesonide-Formoterol Unknown     Muscle Cramps.    Morphine Swelling and Nausea/vomiting    Oxycodone-Acetaminophen Nausea/vomiting     Percocet        Medications:    Current Outpatient Medications:     albuterol 2.5 mg /3 mL (0.083 %) nebulizer solution, USE 3 ML VIA NEBULIZER EVERY 8 HOURS AS NEEDED, Disp: , Rfl:     albuterol 90 mcg/actuation inhaler, INHALE 2 PUFFS AS NEEDED EVERY 6 HOURS., Disp: , Rfl:     atorvastatin (Lipitor) 20 mg tablet, Take 1 tablet (20 mg) by mouth once daily., Disp: 90 tablet, Rfl: 0    brimonidine (AlphaGAN) 0.2 % ophthalmic solution, Administer 1 drop into both eyes 2 times a  day., Disp: , Rfl:     budesonide/glycopyr/formoterol (BREZTRI AEROSPHERE INHL), Inhale 2 puffs 2 times a day., Disp: , Rfl:     cyclobenzaprine (Flexeril) 10 mg tablet, Take 1 tablet (10 mg) by mouth every 12 hours., Disp: , Rfl:     finasteride (Proscar) 5 mg tablet, Take 1 tablet (5 mg) by mouth once daily., Disp: , Rfl:     ipratropium (Atrovent) 0.02 % nebulizer solution, INHALE THE CONTENTS OF 1 VIAL VIA NEBUILZER EVERY 8 HOURS., Disp: , Rfl:     ipratropium-albuteroL (Duo-Neb) 0.5-2.5 mg/3 mL nebulizer solution, 3 mL., Disp: , Rfl:     ketorolac (Toradol) 10 mg tablet, Take 1 tablet (10 mg) by mouth every 6 hours if needed for pain., Disp: , Rfl:     LORazepam (Ativan) 0.5 mg tablet, TAKE ONE TABLET BY MOUTH OR UNDER THE TONGUE EVERY 4 HOURS AS NEEDED, Disp: , Rfl:     pantoprazole (ProtoNix) 40 mg EC tablet, Take 1 tablet (40 mg) by mouth once daily., Disp: 90 tablet, Rfl: 0    promethazine (Phenergan) 25 mg tablet, GIVE ONE TABLET BY MOUTH OR RECTALLY EVERY 4 HOURS AS NEEDED FOR NAUSEA OR vomiting, Disp: , Rfl:     tamsulosin (Flomax) 0.4 mg 24 hr capsule, Take 1 capsule (0.4 mg) by mouth once daily at bedtime., Disp: , Rfl:     torsemide (Demadex) 20 mg tablet, Take 2 tablets (40 mg) by mouth once daily in the morning. Take before meals. (Patient not taking: Reported on 2/19/2025), Disp: , Rfl:     traMADol (Ultram) 50 mg tablet, TAKE 1 TABLET BY MOUTH DAILY AS NEEDED for mild pain FOR 90 DAYS, Disp: , Rfl:     UNABLE TO FIND, Brinonidine eye gtts for pressure, Disp: , Rfl:   No current facility-administered medications for this visit.      Review of Systems:  Review of Systems - Oncology    Performance Status:  The Karnofsky performance scale today is {DESC; KARNOFSKY SCALE WITH ECOG EQUIVALENT:31986}.        OBJECTIVE  There were no vitals taken for this visit.   Physical Exam     Laboratory Review:  There are no laboratory contraindications to radiation therapy.    The pertinent lab results were  reviewed and discussed with the patient.  Notably, ***  {LABORATORY RESULTS:91011}    Pathology Review:  The pertinent pathology results were reviewed and discussed with the patient.  Notably, ***     Imaging:  The pertinent imaging results were reviewed and discussed with the patient.        ASSESSMENT:   Myles Nixon is a 70 y.o. male a suspicious right upper lobe lung nodule 1.2 x 1.2 cm growing in size as compared to the previous scan 0.9 x 1.1 cm on 12/05/2024. PET scan redemonstrating the mass, size 13 mm with max SUV of 4.1.    PLAN:     Medically inoperable, high risk surgical candidate due to severe COPD and hypoxic respiratory failure.  Peripherally located, 55 Gy in 4 fractions  NCCN Guidelines were applicable to guide this patients treatment plan.   We reviewed the standard of care management for Malignant neoplasm of right upper lobe of lung (Multi), Clinical: Stage..... This is an alternate approach for patients who are high-risk surgical candidates or refusing surgery due to any reasons. Pros and cons of surgery vs SBRT/ high-dose hypofractionated IMRT reviewed, including high local control rates with radiation, but risk of future regional, noni or distant relapse. Even in the setting of high-risk surgical comorbidities, radiation treatments can be delivered with high safety.    JEM RICHARDSON

## 2025-02-20 NOTE — PROGRESS NOTES
Radiation Oncology Outpatient Consult    Patient Name:  Myles Nixon  MRN:  [id number]  :  1955    Referring Provider: Sincere Song MD  Primary Care Provider: Eliseo Garcia DO  Care Team: Patient Care Team:  Eliseo Garcia DO as PCP - General (Family Medicine)    Date of Service: 2025     History of Present Illness:  Myles Nixon is a 70 y.o. 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  He is presenting for evaluation and management of No matching staging information was found for the patient.  ***.     H*** oncological work up and treatment history is as below:    Pathology Review:  The pertinent pathology results were reviewed from EMR and discussed with the patient.       Rapid On-Site Evaluation (BOB): Preliminary cytology of the RUL lesion showed non-diagnostic material (final results are pending).   Preliminary cytology from the lymph node station(s) 7, 11Rs showed lymphoid tissue (final results are pending).  Preliminary cytology from the lymph node station(s) 2R, 4R showed non-diagnostic material (final results are pending).    Imaging:  The pertinent imaging results were reviewed from EMR/PACS with key results discussed with the patient.    CT CHEST WITHOUT FOR [facility] PLANNING; 2025   Severe upper lung  predominant centrilobular emphysematous changes. There is a 1.4 x 1.3  cm spiculated solid right upper lung pulmonary nodule (series 3,  image 140). There are scattered solid sub-6 mm pulmonary nodules. For  example, 0.6 cm solid right middle lobe pulmonary nodule (series 3,  image 227).    PET-CT 2025  1. The peripheral right upper lobe spiculated nodule demonstrates mild to moderate focal hypermetabolism, raising concern for primary pulmonary malignancy. Tissue sampling is advised.  2.  No evidence of FDG avid noni or distant metastatic disease.      PFT:  FEV1: *** L/ ***% predicted.  FVC: *** L/ ***% predicted.  DLCOuc: ***% predicted      Review of Systems: See separately signed RN note.  On 6 L Nasal O2 at rest, 8 L at activity.  Has been on O2 for 7-8 years.  Right lower CW pain in the last month.    RADIATION SCREENING QUESTIONS:  Prior radiation therapy: Yes, describe: Prostate Cancer, , Dr. Taylor, [facility]  Pacemaker: No  Other implantable devices: No  Connective tissue disease: No    Current Systemic Treatment:  No     Past Medical History:    Past Medical History:   Diagnosis Date    Atrial fibrillation (Multi)     COPD (chronic obstructive pulmonary disease) (Multi)     CVA (cerebral vascular accident) (Multi)     High cholesterol     On home O2     Personal history of other diseases of the digestive system     History of gastroesophageal reflux (GERD)    Personal history of other diseases of the nervous system and sense organs     History of macular degeneration    Prostate cancer (Multi)      Past Surgical History:    Past Surgical History:   Procedure Laterality Date    OTHER SURGICAL HISTORY  2019    Biceps tendon rupture repair    OTHER SURGICAL HISTORY  2019    Leg surgery    OTHER SURGICAL HISTORY  2019    Cholecystectomy laparoscopic      Family History:  Cancer-related family history includes Bone cancer in his sister; Breast cancer in his maternal grandmother, mother's sister, sister, sister, and sister; Prostate cancer in his brother; Vaginal cancer in his mother.  Social History:    Social History     Tobacco Use    Smoking status: Former     Current packs/day: 0.00     Average packs/day: 2.5 packs/day for 48.0 years (120.0 ttl pk-yrs)     Types: Cigarettes     Start date:      Quit date: 2017     Years since quittin.1    Smokeless tobacco: Never   Vaping Use    Vaping status: Never Used   Substance Use Topics    Alcohol use:  Not Currently     Comment: 1 beer on ocasion    Drug use: Never     Allergies:    Allergies   Allergen Reactions    Budesonide-Formoterol Unknown     Muscle Cramps.    Morphine Swelling and Nausea/vomiting    Oxycodone-Acetaminophen Nausea/vomiting     Percocet     Medications:    Current Outpatient Medications:     albuterol 90 mcg/actuation inhaler, INHALE 2 PUFFS AS NEEDED EVERY 6 HOURS., Disp: , Rfl:     atorvastatin (Lipitor) 20 mg tablet, Take 1 tablet (20 mg) by mouth once daily., Disp: 90 tablet, Rfl: 0    brimonidine (AlphaGAN) 0.2 % ophthalmic solution, Administer 1 drop into both eyes 2 times a day., Disp: , Rfl:     budesonide/glycopyr/formoterol (BREZTRI AEROSPHERE INHL), Inhale 2 puffs 2 times a day., Disp: , Rfl:     cyclobenzaprine (Flexeril) 10 mg tablet, Take 1 tablet (10 mg) by mouth every 12 hours., Disp: , Rfl:     finasteride (Proscar) 5 mg tablet, Take 1 tablet (5 mg) by mouth once daily., Disp: , Rfl:     ipratropium-albuteroL (Duo-Neb) 0.5-2.5 mg/3 mL nebulizer solution, 3 mL., Disp: , Rfl:     pantoprazole (ProtoNix) 40 mg EC tablet, Take 1 tablet (40 mg) by mouth once daily., Disp: 90 tablet, Rfl: 0    tamsulosin (Flomax) 0.4 mg 24 hr capsule, Take 1 capsule (0.4 mg) by mouth once daily at bedtime., Disp: , Rfl:     traMADol (Ultram) 50 mg tablet, TAKE 1 TABLET BY MOUTH DAILY AS NEEDED for mild pain FOR 90 DAYS, Disp: , Rfl:     albuterol 2.5 mg /3 mL (0.083 %) nebulizer solution, USE 3 ML VIA NEBULIZER EVERY 8 HOURS AS NEEDED (Patient not taking: Reported on 2/20/2025), Disp: , Rfl:     ipratropium (Atrovent) 0.02 % nebulizer solution, INHALE THE CONTENTS OF 1 VIAL VIA NEBUILZER EVERY 8 HOURS. (Patient not taking: Reported on 2/20/2025), Disp: , Rfl:     ketorolac (Toradol) 10 mg tablet, Take 1 tablet (10 mg) by mouth every 6 hours if needed for pain. (Patient not taking: Reported on 2/20/2025), Disp: , Rfl:     LORazepam (Ativan) 0.5 mg tablet, TAKE ONE TABLET BY MOUTH OR UNDER THE  TONGUE EVERY 4 HOURS AS NEEDED (Patient not taking: Reported on 2/20/2025), Disp: , Rfl:     promethazine (Phenergan) 25 mg tablet, GIVE ONE TABLET BY MOUTH OR RECTALLY EVERY 4 HOURS AS NEEDED FOR NAUSEA OR vomiting (Patient not taking: Reported on 2/20/2025), Disp: , Rfl:     torsemide (Demadex) 20 mg tablet, Take 2 tablets (40 mg) by mouth once daily in the morning. Take before meals. (Patient not taking: Reported on 2/19/2025), Disp: , Rfl:     UNABLE TO FIND, Brinonidine eye gtts for pressure (Patient not taking: Reported on 2/20/2025), Disp: , Rfl:       Performance Status:  The Karnofsky performance scale today is 70, Cares for self; unable to carry on normal activity or to do active work (ECOG equivalent 1).     OBJECTIVE  Physical Exam:  /79   Pulse (!) 111   Temp 36.6 °C (97.9 °F) (Skin)   Resp 18   Wt 102 kg (225 lb 15.5 oz)   SpO2 94%   BMI 32.42 kg/m²    Physical Exam     Laboratory Review:  The pertinent lab results were reviewed and discussed with the patient.      ASSESSMENT:  Myles Nixon is a 70 y.o. male with No matching staging information was found for the patient..  ***.     PLAN:    Mr. Nixon's pertinent history, exam, imaging and pathology details were reviewed.   Accompanied by ***.    Treatment recommendations/Alternatives:  NCCN Guidelines {Actions; were/were not:41047} applicable to guide this patients treatment plan.   We reviewed the standard of care management for ***.     Per tumor board discussion ***he was recommended ***.  Treatment alternatives including *** were reviewed.    Radiation treatment logistics:  We then focused our attention regarding logistics, rationale and potential risks with radiation therapy. This will need an initial simulation/mapping that will involve a planning CT scan in treatment position followed by a 2-3 week planning period and then initiation of radiation treatments. *** will be planned for *** fractions, daily sessions, Monday-Friday,  as out-patient.    We then reviewed the role of advanced radiation modalities including proton therapy and VMAT/IMRT (photons). Given the ***, there might be dosimetric benefits of considering proton therapy to reduce dose to the ***. This could potentially translate to reduced risk of *** complications. As such, I have recommended proton therapy.    We then reviewed possible side effects (Acute and long-term). Common and uncommon side effects with risks as relevant for his case were discussed.    After detailed discussion of risks/benefits/goals/alternatives, patient signed the informed consent.  CT simulation will be arranged at the earliest.    Orders placed:  1) ***PFTs  2) Radonc intent to treat: IMRT/VMAT    Network location: The patient will be treated at the  *** location. Simulation will be done at the *** location.    Pain Management:  No acute needs    Social Work:  No acute needs    Nutrition: Will be seen by Dietician.    The patient was provided my contact information.       Rebecca Saucedo MD, MMM  Senior Attending Physician, [facility]  Professor, [employer]   Our [place]: “To Heal, To Teach, To Discover.”  RN partner: [phone]/ [email or web address]    Phone (scheduling): [phone]/ [email or web address]  Proton Therapy (scheduling): [phone]/ [email or web address]  Phone (after hours): [phone]    Guidelines were applicable to guide this patients treatment plan.   We reviewed the standard of care management for Non-small cell carcinoma of right lung, stage 1 (Multi), Clinical: Stage IA2 (cT1b, cN0(f), cM0) including the role of SBRT/ high-dose hypofractionated IMRT. This is an alternate approach for patients who are high-risk surgical candidates or refusing surgery due to any reasons. Pros and cons of surgery vs SBRT/ high-dose hypofractionated IMRT reviewed, including high local control rates with radiation, but risk of future regional, noni or distant relapse. Even in the setting of high-risk surgical comorbidities, radiation treatments can be delivered with high safety.    With these radiation approaches, the target volume is the involved lesion. The noni regions are not included and will need to be followed closely.    Given the lesion is peripherally located, we will plan a 4-5 fraction course, every other day, unless excess motion is noted on 4D CT in which case a hypofractionated course might be needed.    We will follow him closely for his ongoing right chest wall pain which might related to his recent procedure.    Radiation treatment logistics:  We then focused our attention regarding logistics, rationale and potential risks with radiation therapy. This will need an initial simulation/mapping that will involve a planning CT scan in treatment position followed by a 2-3 week planning period and then initiation of radiation treatments.     We then reviewed possible side effects (Acute and long-term). Common and uncommon side effects with risks as relevant for his  case were discussed.  Since he is already on 6 to 8 L of nasal oxygen he is at higher risk of postradiation complications.  This risk was extensively discussed.    After detailed discussion of risks/benefits/goals/alternatives, patient signed the informed consent.  CT simulation will be arranged at the earliest.    Orders placed:  1) [facility]  intent to treat: SBRT    Network location: The patient will be treated at the [facility] location. Simulation will be done at the [facility] location.    Pain Management:  No acute needs    Social Work:  No acute needs    Nutrition: No acute needs    Clinical Trials: None applicable.    LONGITUDINAL CARE, EXTRA EFFORT/ : The patient will be followed longitudinally by providers (including APPs) in the department of radiation oncology for monitoring treatment effects during and after radiation. Additional effort needed in the setting of advanced COPD and coordination of care with other providers.       The patient was provided my contact information.       Rebecca Saucedo MD, MMM  Senior Attending Physician, [facility]  Professor, [employer] [facility] [employer]   Our [place]: “To Heal, To Teach, To Discover.”  RN partner: [phone]/ [email or web address]    Phone (scheduling): [phone]/[email or web address]  Proton Therapy (scheduling): [phone]/ [email or web address]  Phone (after hours): [phone]        ADDENDUM:  Surgical pathology results as summarized below.  We would proceed with scheduling CT simulation for SBRT.    FINAL DIAGNOSIS SURGICAL PATHOLOGY 2/19/2025      A.  LUNG, RIGHT UPPER LOBE, BIOPSY:  - Invasive squamous cell carcinoma, minimally represented. See note.  - See concurrent cytology specimen [id number]     Note: Multiple rounds of deeper levels are reviewed. Small nests of malignant cells are seen involving lung parenchyma. PD-L1 immunostain will be ordered and report issued as an addendum. Supporting immunohistochemical stains were performed on the concurrent cytology specimen. Molecular studies will be attempted on the cytology specimen.         Final Cytological Interpretation 2/19/2025   A. Lung fine needle aspiration right upper lobe , cytology and cell block:  -- Non-diagnostic  specimen consisting mostly of blood and few bronchial epithelial cells.  -- See surgical specimen [id number].      B. Bronchial brush right upper lobe - rul brush , cytology and cell block:  -- Non-diagnostic specimen consisting mostly of blood.                   C. Lung fine needle aspiration right upper lobe - rul nodule (gen cut) , cytology and cell block:  -- Malignant cells present derived from squamous cell carcinoma, see note.   -- See surgical specimen [id number].     Note: Immunocytochemical stains performed on the cell block show the tumor positive for cytokeratin AE1/3 and p40 and the tumor negative for TTF-1, NKX3.1, INSM1, syanptophysin, chromogranin, and CK7. The morphologic and immunophenotypic findings support the above diagnosis.      Molecular testing has been ordered and results will be issued in a separate report.  The cell block contains low tumor cellularity representing roughly 55% of all nucleated cells.       D. Lymph node 2 r pulmonary fine needle aspiration , cytology and cell block:  -- No malignant cells identified.  -- Extremely limited lymphoid sample.          E. Lymph node 4 r pulmonary fine needle aspiration , cytology and cell block:  -- Non-diagnostic specimen consisting mostly of bronchial epithelial cells and blood.              F. Lymph node 7 pulmonary fine needle aspiration , cytology and cell block:  -- No malignant cells identified.  -- Lymphoid sample.     G. Lymph node 11 rs pulmonary fine needle aspiration , cytology and cell block:  -- No malignant cells identified.  -- Limited lymphoid sample.

## 2025-02-25 ENCOUNTER — TELEPHONE (OUTPATIENT)
Dept: ADMISSION | Facility: HOSPITAL | Age: 70
End: 2025-02-25
Payer: MEDICARE

## 2025-02-25 NOTE — TELEPHONE ENCOUNTER
Patient calling about upcoming NPV with Palliative on 3/5/25. He feels he does not need this service, he would like to cancel it. He will continue to follow with his oncologist and PCP provider. Appt cancelled per patient request

## 2025-02-26 PROBLEM — C34.91: Status: ACTIVE | Noted: 2025-02-26

## 2025-02-26 LAB
LAB AP ASR DISCLAIMER: NORMAL
LAB AP ASR DISCLAIMER: NORMAL
LABORATORY COMMENT REPORT: NORMAL
PATH REPORT.COMMENTS IMP SPEC: NORMAL
PATH REPORT.COMMENTS IMP SPEC: NORMAL
PATH REPORT.FINAL DX SPEC: NORMAL
PATH REPORT.FINAL DX SPEC: NORMAL
PATH REPORT.GROSS SPEC: NORMAL
PATH REPORT.GROSS SPEC: NORMAL
PATH REPORT.INTRAOP OBS SPEC DOC: NORMAL
PATH REPORT.INTRAOP OBS SPEC DOC: NORMAL
PATH REPORT.RELEVANT HX SPEC: NORMAL
PATH REPORT.RELEVANT HX SPEC: NORMAL
PATH REPORT.TOTAL CANCER: NORMAL
PATH REPORT.TOTAL CANCER: NORMAL

## 2025-02-27 LAB
LAB AP ASR DISCLAIMER: NORMAL
LABORATORY COMMENT REPORT: NORMAL
PATH REPORT.ADDENDUM SPEC: NORMAL
PATH REPORT.FINAL DX SPEC: NORMAL
PATH REPORT.GROSS SPEC: NORMAL
PATH REPORT.TOTAL CANCER: NORMAL

## 2025-02-28 ENCOUNTER — HOSPITAL ENCOUNTER (OUTPATIENT)
Dept: RADIOLOGY | Facility: EXTERNAL LOCATION | Age: 70
Discharge: HOME | End: 2025-02-28

## 2025-02-28 ENCOUNTER — HOSPITAL ENCOUNTER (OUTPATIENT)
Dept: RADIATION ONCOLOGY | Facility: HOSPITAL | Age: 70
Setting detail: RADIATION/ONCOLOGY SERIES
Discharge: HOME | End: 2025-02-28
Payer: MEDICARE

## 2025-02-28 DIAGNOSIS — C34.11 MALIGNANT NEOPLASM OF UPPER LOBE OF RIGHT LUNG (MULTI): Primary | ICD-10-CM

## 2025-02-28 DIAGNOSIS — C34.11 MALIGNANT NEOPLASM OF UPPER LOBE OF RIGHT LUNG (MULTI): ICD-10-CM

## 2025-02-28 PROCEDURE — 77334 RADIATION TREATMENT AID(S): CPT | Performed by: STUDENT IN AN ORGANIZED HEALTH CARE EDUCATION/TRAINING PROGRAM

## 2025-02-28 PROCEDURE — 77290 THER RAD SIMULAJ FIELD CPLX: CPT | Performed by: STUDENT IN AN ORGANIZED HEALTH CARE EDUCATION/TRAINING PROGRAM

## 2025-03-05 ENCOUNTER — APPOINTMENT (OUTPATIENT)
Dept: PALLIATIVE MEDICINE | Facility: HOSPITAL | Age: 70
End: 2025-03-05
Payer: MEDICARE

## 2025-03-05 ENCOUNTER — HOSPITAL ENCOUNTER (OUTPATIENT)
Dept: RADIATION ONCOLOGY | Facility: HOSPITAL | Age: 70
Setting detail: RADIATION/ONCOLOGY SERIES
Discharge: HOME | End: 2025-03-05
Payer: MEDICARE

## 2025-03-05 LAB
ELECTRONICALLY SIGNED BY: NORMAL
FOCUSED SOLID TUMOR DNA/RNA RESULTS: NORMAL

## 2025-03-05 PROCEDURE — 77295 3-D RADIOTHERAPY PLAN: CPT | Performed by: RADIOLOGY

## 2025-03-05 PROCEDURE — 77334 RADIATION TREATMENT AID(S): CPT | Performed by: RADIOLOGY

## 2025-03-05 PROCEDURE — 77300 RADIATION THERAPY DOSE PLAN: CPT | Performed by: RADIOLOGY

## 2025-03-05 PROCEDURE — 77293 RESPIRATOR MOTION MGMT SIMUL: CPT | Performed by: RADIOLOGY

## 2025-03-05 PROCEDURE — 81458 SO GSAP DNA CPY NMBR&MCRSTL: CPT | Performed by: INTERNAL MEDICINE

## 2025-03-11 ENCOUNTER — HOSPITAL ENCOUNTER (OUTPATIENT)
Dept: RADIATION ONCOLOGY | Facility: HOSPITAL | Age: 70
Setting detail: RADIATION/ONCOLOGY SERIES
Discharge: HOME | End: 2025-03-11
Payer: MEDICARE

## 2025-03-11 DIAGNOSIS — C34.11 MALIGNANT NEOPLASM OF UPPER LOBE, RIGHT BRONCHUS OR LUNG: ICD-10-CM

## 2025-03-11 DIAGNOSIS — Z51.0 ENCOUNTER FOR ANTINEOPLASTIC RADIATION THERAPY: ICD-10-CM

## 2025-03-11 LAB
RAD ONC MSQ ACTUAL FRACTIONS DELIVERED: 1
RAD ONC MSQ ACTUAL SESSION DELIVERED DOSE: 1000 CGRAY
RAD ONC MSQ ACTUAL TOTAL DOSE: 1000 CGRAY
RAD ONC MSQ ELAPSED DAYS: 0
RAD ONC MSQ LAST DATE: NORMAL
RAD ONC MSQ PRESCRIBED FRACTIONAL DOSE: 1000 CGRAY
RAD ONC MSQ PRESCRIBED NUMBER OF FRACTIONS: 5
RAD ONC MSQ PRESCRIBED TECHNIQUE: NORMAL
RAD ONC MSQ PRESCRIBED TOTAL DOSE: 5000 CGRAY
RAD ONC MSQ PRESCRIPTION PATTERN COMMENT: NORMAL
RAD ONC MSQ START DATE: NORMAL
RAD ONC MSQ TREATMENT COURSE NUMBER: 1
RAD ONC MSQ TREATMENT SITE: NORMAL

## 2025-03-11 PROCEDURE — 77373 STRTCTC BDY RAD THER TX DLVR: CPT | Performed by: STUDENT IN AN ORGANIZED HEALTH CARE EDUCATION/TRAINING PROGRAM

## 2025-03-11 PROCEDURE — 77280 THER RAD SIMULAJ FIELD SMPL: CPT | Performed by: STUDENT IN AN ORGANIZED HEALTH CARE EDUCATION/TRAINING PROGRAM

## 2025-03-12 ENCOUNTER — HOSPITAL ENCOUNTER (OUTPATIENT)
Dept: RADIATION ONCOLOGY | Facility: HOSPITAL | Age: 70
Setting detail: RADIATION/ONCOLOGY SERIES
Discharge: HOME | End: 2025-03-12
Payer: MEDICARE

## 2025-03-12 DIAGNOSIS — Z51.0 ENCOUNTER FOR ANTINEOPLASTIC RADIATION THERAPY: ICD-10-CM

## 2025-03-12 DIAGNOSIS — C34.11 MALIGNANT NEOPLASM OF UPPER LOBE, RIGHT BRONCHUS OR LUNG: ICD-10-CM

## 2025-03-12 LAB
RAD ONC MSQ ACTUAL FRACTIONS DELIVERED: 2
RAD ONC MSQ ACTUAL SESSION DELIVERED DOSE: 1000 CGRAY
RAD ONC MSQ ACTUAL TOTAL DOSE: 2000 CGRAY
RAD ONC MSQ ELAPSED DAYS: 1
RAD ONC MSQ LAST DATE: NORMAL
RAD ONC MSQ PRESCRIBED FRACTIONAL DOSE: 1000 CGRAY
RAD ONC MSQ PRESCRIBED NUMBER OF FRACTIONS: 5
RAD ONC MSQ PRESCRIBED TECHNIQUE: NORMAL
RAD ONC MSQ PRESCRIBED TOTAL DOSE: 5000 CGRAY
RAD ONC MSQ PRESCRIPTION PATTERN COMMENT: NORMAL
RAD ONC MSQ START DATE: NORMAL
RAD ONC MSQ TREATMENT COURSE NUMBER: 1
RAD ONC MSQ TREATMENT SITE: NORMAL

## 2025-03-12 PROCEDURE — 77373 STRTCTC BDY RAD THER TX DLVR: CPT | Performed by: STUDENT IN AN ORGANIZED HEALTH CARE EDUCATION/TRAINING PROGRAM

## 2025-03-12 PROCEDURE — 77336 RADIATION PHYSICS CONSULT: CPT | Performed by: RADIOLOGY

## 2025-03-13 ENCOUNTER — HOSPITAL ENCOUNTER (OUTPATIENT)
Dept: RADIATION ONCOLOGY | Facility: HOSPITAL | Age: 70
Setting detail: RADIATION/ONCOLOGY SERIES
Discharge: HOME | End: 2025-03-13
Payer: MEDICARE

## 2025-03-13 DIAGNOSIS — C34.11 MALIGNANT NEOPLASM OF UPPER LOBE, RIGHT BRONCHUS OR LUNG: ICD-10-CM

## 2025-03-13 DIAGNOSIS — Z51.0 ENCOUNTER FOR ANTINEOPLASTIC RADIATION THERAPY: ICD-10-CM

## 2025-03-13 LAB
RAD ONC MSQ ACTUAL FRACTIONS DELIVERED: 3
RAD ONC MSQ ACTUAL SESSION DELIVERED DOSE: 1000 CGRAY
RAD ONC MSQ ACTUAL TOTAL DOSE: 3000 CGRAY
RAD ONC MSQ ELAPSED DAYS: 2
RAD ONC MSQ LAST DATE: NORMAL
RAD ONC MSQ PRESCRIBED FRACTIONAL DOSE: 1000 CGRAY
RAD ONC MSQ PRESCRIBED NUMBER OF FRACTIONS: 5
RAD ONC MSQ PRESCRIBED TECHNIQUE: NORMAL
RAD ONC MSQ PRESCRIBED TOTAL DOSE: 5000 CGRAY
RAD ONC MSQ PRESCRIPTION PATTERN COMMENT: NORMAL
RAD ONC MSQ START DATE: NORMAL
RAD ONC MSQ TREATMENT COURSE NUMBER: 1
RAD ONC MSQ TREATMENT SITE: NORMAL

## 2025-03-13 PROCEDURE — 77373 STRTCTC BDY RAD THER TX DLVR: CPT | Performed by: RADIOLOGY

## 2025-03-14 ENCOUNTER — HOSPITAL ENCOUNTER (OUTPATIENT)
Dept: RADIATION ONCOLOGY | Facility: HOSPITAL | Age: 70
Setting detail: RADIATION/ONCOLOGY SERIES
Discharge: HOME | End: 2025-03-14
Payer: MEDICARE

## 2025-03-14 VITALS
DIASTOLIC BLOOD PRESSURE: 92 MMHG | WEIGHT: 213.5 LBS | OXYGEN SATURATION: 98 % | HEIGHT: 70 IN | SYSTOLIC BLOOD PRESSURE: 155 MMHG | BODY MASS INDEX: 30.56 KG/M2 | TEMPERATURE: 96.8 F | RESPIRATION RATE: 18 BRPM | HEART RATE: 91 BPM

## 2025-03-14 DIAGNOSIS — C34.11 MALIGNANT NEOPLASM OF UPPER LOBE, RIGHT BRONCHUS OR LUNG: ICD-10-CM

## 2025-03-14 DIAGNOSIS — C34.91: Primary | ICD-10-CM

## 2025-03-14 DIAGNOSIS — Z51.0 ENCOUNTER FOR ANTINEOPLASTIC RADIATION THERAPY: ICD-10-CM

## 2025-03-14 LAB
RAD ONC MSQ ACTUAL FRACTIONS DELIVERED: 4
RAD ONC MSQ ACTUAL SESSION DELIVERED DOSE: 1000 CGRAY
RAD ONC MSQ ACTUAL TOTAL DOSE: 4000 CGRAY
RAD ONC MSQ ELAPSED DAYS: 3
RAD ONC MSQ LAST DATE: NORMAL
RAD ONC MSQ PRESCRIBED FRACTIONAL DOSE: 1000 CGRAY
RAD ONC MSQ PRESCRIBED NUMBER OF FRACTIONS: 5
RAD ONC MSQ PRESCRIBED TECHNIQUE: NORMAL
RAD ONC MSQ PRESCRIBED TOTAL DOSE: 5000 CGRAY
RAD ONC MSQ PRESCRIPTION PATTERN COMMENT: NORMAL
RAD ONC MSQ START DATE: NORMAL
RAD ONC MSQ TREATMENT COURSE NUMBER: 1
RAD ONC MSQ TREATMENT SITE: NORMAL

## 2025-03-14 PROCEDURE — 77373 STRTCTC BDY RAD THER TX DLVR: CPT | Performed by: INTERNAL MEDICINE

## 2025-03-14 ASSESSMENT — PAIN SCALES - GENERAL: PAINLEVEL_OUTOF10: 0-NO PAIN

## 2025-03-14 NOTE — PROGRESS NOTES
"Radiation Oncology On Treatment Visit    Patient Name:  Myles Nixon  MRN:  [id number]  :  1955    Referring Provider: Sincere Song MD  Primary Care Provider: Eliseo Garcia DO  Care Team:   Patient Care Team:  Eliseo Garcia DO as PCP - General (Family Medicine)  Eliseo Garcia DO as PCP - MSSP ACO Attributed Provider    Date of Service: 3/14/2025     Diagnosis:   Specialty Problems    None    Treatment Summary:  SBRT: Right Lung or bronchus    Treatment Period Technique Fraction Dose Fractions Total Dose   Course 1 3/11/2025-3/14/2025  (days elapsed: 3)         RUL 3/11/2025-3/14/2025 SBRT 1000 / 1,000 cGy  / 5 4000 / 5,000 cGy     SUBJECTIVE: Tolerating well. No worsening cough, chest pain, breathing difficulty, wheezing.  O2 machine had to be changed. Still on 6L at rest, and 8 L on activity.    Had some nausea through the week but was coming fasting for each treatment. Felt ok otherwise.    OBJECTIVE:   Vital Signs:  BP (!) 155/92   Pulse 91   Temp 36 °C (96.8 °F) (Temporal)   Resp 18   Ht 1.778 m (5' 10\")   Wt 96.8 kg (213 lb 8 oz)   SpO2 98%   BMI 30.63 kg/m²     Other Pertinent Findings: Sitting comfortably on wheelchair. Alert, oriented. Continous Nasal O2. no wheezing.     Toxicity Assessment          3/14/2025    15:29   Toxicity Assessment   Adverse Events Reviewed (WDL) No (Exceptions to WDL)   Treatment Site Thoracic   Fatigue Grade 1   Nausea Grade 1   Pain Grade 0   Vomiting Grade 0   Cough Grade 0   Dyspnea Grade 1       pt. on O2 6L   Hypoxia Grade 0          Assessment / Plan:  Dosimetry/IGRT reviewed.  The patient is tolerating radiation therapy as anticipated.  Continue per current treatment plan.     Scheduled to finish Monday.  RTC 3 months with CT Ch wo con.        Rebecca Saucedo MD, MMM  Senior Attending Physician, [facility]  Professor, [employer]   Our [place]: “To Heal, To Teach, To Discover.”  RN partner: " [phone]/ [email or web address]    Phone (scheduling): [phone]  Proton Therapy (scheduling): [phone]/ [email or web address]  Phone (after hours): [phone]

## 2025-03-17 ENCOUNTER — HOSPITAL ENCOUNTER (OUTPATIENT)
Dept: RADIATION ONCOLOGY | Facility: HOSPITAL | Age: 70
Setting detail: RADIATION/ONCOLOGY SERIES
Discharge: HOME | End: 2025-03-17
Payer: MEDICARE

## 2025-03-17 ENCOUNTER — DOCUMENTATION (OUTPATIENT)
Dept: RADIATION ONCOLOGY | Facility: HOSPITAL | Age: 70
End: 2025-03-17
Payer: MEDICARE

## 2025-03-17 DIAGNOSIS — Z51.0 ENCOUNTER FOR ANTINEOPLASTIC RADIATION THERAPY: ICD-10-CM

## 2025-03-17 DIAGNOSIS — C34.11 MALIGNANT NEOPLASM OF UPPER LOBE, RIGHT BRONCHUS OR LUNG: ICD-10-CM

## 2025-03-17 LAB
RAD ONC MSQ ACTUAL FRACTIONS DELIVERED: 5
RAD ONC MSQ ACTUAL SESSION DELIVERED DOSE: 1000 CGRAY
RAD ONC MSQ ACTUAL TOTAL DOSE: 5000 CGRAY
RAD ONC MSQ ELAPSED DAYS: 6
RAD ONC MSQ LAST DATE: NORMAL
RAD ONC MSQ PRESCRIBED FRACTIONAL DOSE: 1000 CGRAY
RAD ONC MSQ PRESCRIBED NUMBER OF FRACTIONS: 5
RAD ONC MSQ PRESCRIBED TECHNIQUE: NORMAL
RAD ONC MSQ PRESCRIBED TOTAL DOSE: 5000 CGRAY
RAD ONC MSQ PRESCRIPTION PATTERN COMMENT: NORMAL
RAD ONC MSQ START DATE: NORMAL
RAD ONC MSQ TREATMENT COURSE NUMBER: 1
RAD ONC MSQ TREATMENT SITE: NORMAL

## 2025-03-17 PROCEDURE — 77373 STRTCTC BDY RAD THER TX DLVR: CPT | Performed by: RADIOLOGY

## 2025-03-24 LAB
NON-UH HIE ALB: 3.6 G/DL (ref 3.4–5)
NON-UH HIE ALK PHOS: 101 UNIT/L (ref 45–117)
NON-UH HIE BILIRUBIN, DIRECT: 0.51 MG/DL (ref 0–0.4)
NON-UH HIE BILIRUBIN, TOTAL: 1.6 MG/DL (ref 0.3–1.2)
NON-UH HIE CALCULATED LDL CHOLESTEROL: 56 MG/DL (ref 60–130)
NON-UH HIE CHOLESTEROL: 133 MG/DL (ref 100–200)
NON-UH HIE GOT: 30 UNIT/L (ref 15–37)
NON-UH HIE GPT: 31 UNIT/L (ref 10–49)
NON-UH HIE HDL CHOLESTEROL: 53 MG/DL (ref 40–60)
NON-UH HIE HEPATITIS C ANTIBODY: NORMAL
NON-UH HIE HGB A1C: 6 %
NON-UH HIE INR: 1.1
NON-UH HIE PROTIME PATIENT: 12.5 SECONDS (ref 9.8–12.4)
NON-UH HIE TOTAL CHOL/HDL CHOL RATIO: 2.5
NON-UH HIE TOTAL PROTEIN: 6.6 G/DL (ref 5.7–8.2)
NON-UH HIE TRIGLYCERIDES: 121 MG/DL (ref 30–150)

## 2025-03-24 NOTE — PROGRESS NOTES
Myles Nixon  [id number]  1955    SBRT: Right Lung or bronchus    Treatment Period Technique Fraction Dose Fractions Total Dose   Course 1 3/11/2025-3/17/2025  (days elapsed: 6)         RUL 3/11/2025-3/17/2025 SBRT 1000 / 1,000 cGy 5 / 5 5000 / 5,000 cGy        Patient Disposition:  The patient is scheduled for a follow up at our clinic with LESLY Brooke on 6/16/2025 with a CT chest. The patient is encouraged to contact the radiation department for any questions or concerns.     Kristal Kennedy, RN, BSN  Nurse Partner to Dr. Saucedo  Radiation Oncology, [facility]  Phone - [phone]  Fax - [phone]

## 2025-03-30 NOTE — RADIATION COMPLETION NOTES
Radiation Oncology Treatment Summary    Patient Name:  Myles Nixon  MRN:  [id number]  :  1955    Referring Provider: Sincere Song MD   Primary Care Provider: Eliseo Garcia DO    Brief History: Myles Nixon is a 70 y.o. male h/o working in coal mine and [employer], former smoker (quite ), history of severe COPD, hypoxic respiratory failure on nasal O2, and prior history of prostate cancer treated in .  He now presents with  Non-small cell carcinoma of right lung, stage 1 (Multi), Clinical: Stage IA2 (cT1b, cN0(f), cM0). PET/CT did not identify any concerns for noni or distant metastatic disease.  He is currently on 6 L of oxygen at rest and 8 L at activity, has been on supplemental oxygen for 7 to 8 years. Because of his poor PFT he is a high surgical risk and has been referred for stereotactic radiation. The patient completed radiotherapy as outlined below.    Since he is already on 6 to 8 L of nasal oxygen he is at higher risk of postradiation complications. This risk was extensively discussed.     Radiation Treatment Summary:    SBRT: Right Lung or bronchus    Treatment Period Technique Fraction Dose Fractions Total Dose   Course 1 3/11/2025-3/17/2025  (days elapsed: 6)         RUL 3/11/2025-3/17/2025 SBRT 1000 / 1,000 cGy 5 / 5 5000 / 5,000 cGy       Please see the patient's [facility] chart for further details regarding the radiation plan, including beam energy.    Concurrent Chemotherapy:  Treatment Plans       No treatment plans exist          CTCAE Toxicity Overview:   Toxicity Assessment          3/14/2025    15:29   Toxicity Assessment   Adverse Events Reviewed (WDL) No (Exceptions to WDL)   Treatment Site Thoracic   Fatigue Grade 1   Nausea Grade 1   Pain Grade 0   Vomiting Grade 0   Cough Grade 0   Dyspnea Grade 1       pt. on O2 6L   Hypoxia Grade 0     Patient Disposition: The patient is scheduled for a follow up at our clinic with LESLY Brooke on 2025 with a CT  chest. The patient is encouraged to contact the radiation department for any questions or concerns.     Future Appointments       Date / Time Provider Department Dept Phone    6/16/2025 3:30 PM (Arrive by 3:15 PM) [facility] 1 [facility] [phone]    6/16/2025 4:30 PM Shantell Dean, APRN-CNP [facility] [phone]              Rebecca Saucedo MD, MMM  Senior Attending Physician, [facility]  Professor, [facility]   Our [place]: “To Heal, To Teach, To Discover.”  RN partner: [phone]/ [email or web address]    Phone (scheduling): [phone]/[email or web address]  Proton Therapy (scheduling): [phone]/ [email or web address]  Phone (after hours): [phone]

## 2025-06-02 DIAGNOSIS — Z00.00 WELL ADULT EXAM: ICD-10-CM

## 2025-06-13 ENCOUNTER — TELEPHONE (OUTPATIENT)
Dept: RADIATION ONCOLOGY | Facility: HOSPITAL | Age: 70
End: 2025-06-13
Payer: MEDICARE

## 2025-06-13 NOTE — TELEPHONE ENCOUNTER
Called pt to remind of appointment on 6/16/2025 at 4:30 Pt answered and will be present.      Left message for patient to return our call 08/18/2021.

## 2025-06-16 ENCOUNTER — HOSPITAL ENCOUNTER (OUTPATIENT)
Dept: RADIATION ONCOLOGY | Facility: HOSPITAL | Age: 70
Setting detail: RADIATION/ONCOLOGY SERIES
Discharge: HOME | End: 2025-06-16
Payer: MEDICARE

## 2025-06-16 ENCOUNTER — HOSPITAL ENCOUNTER (OUTPATIENT)
Dept: RADIOLOGY | Facility: HOSPITAL | Age: 70
Discharge: HOME | End: 2025-06-16
Payer: MEDICARE

## 2025-06-16 ENCOUNTER — APPOINTMENT (OUTPATIENT)
Dept: RADIATION ONCOLOGY | Facility: HOSPITAL | Age: 70
End: 2025-06-16
Payer: MEDICARE

## 2025-06-16 ENCOUNTER — APPOINTMENT (OUTPATIENT)
Dept: RADIOLOGY | Facility: HOSPITAL | Age: 70
End: 2025-06-16
Payer: MEDICARE

## 2025-06-16 VITALS
SYSTOLIC BLOOD PRESSURE: 137 MMHG | OXYGEN SATURATION: 94 % | WEIGHT: 219.3 LBS | BODY MASS INDEX: 31.47 KG/M2 | TEMPERATURE: 97.3 F | RESPIRATION RATE: 18 BRPM | HEART RATE: 101 BPM | DIASTOLIC BLOOD PRESSURE: 81 MMHG

## 2025-06-16 DIAGNOSIS — C34.91: ICD-10-CM

## 2025-06-16 DIAGNOSIS — C34.11 MALIGNANT NEOPLASM OF UPPER LOBE, RIGHT BRONCHUS OR LUNG: Primary | ICD-10-CM

## 2025-06-16 PROCEDURE — 71250 CT THORAX DX C-: CPT

## 2025-06-16 PROCEDURE — 99215 OFFICE O/P EST HI 40 MIN: CPT

## 2025-06-16 PROCEDURE — 71250 CT THORAX DX C-: CPT | Performed by: RADIOLOGY

## 2025-06-16 ASSESSMENT — ENCOUNTER SYMPTOMS
ARTHRALGIAS: 0
FREQUENCY: 0
UNEXPECTED WEIGHT CHANGE: 0
OCCASIONAL FEELINGS OF UNSTEADINESS: 0
ALLERGIC/IMMUNOLOGIC NEGATIVE: 1
RECTAL PAIN: 0
WEAKNESS: 0
ANAL BLEEDING: 0
HEMATURIA: 0
COUGH: 0
BACK PAIN: 0
DEPRESSION: 0
DIARRHEA: 0
DIZZINESS: 0
DIFFICULTY URINATING: 0
PALPITATIONS: 0
JOINT SWELLING: 0
CHEST TIGHTNESS: 0
CONSTIPATION: 0
ABDOMINAL PAIN: 0
DYSURIA: 0
PSYCHIATRIC NEGATIVE: 1
FATIGUE: 0
BLOOD IN STOOL: 0
FEVER: 0
SHORTNESS OF BREATH: 0
LOSS OF SENSATION IN FEET: 0

## 2025-06-16 ASSESSMENT — COLUMBIA-SUICIDE SEVERITY RATING SCALE - C-SSRS
1. IN THE PAST MONTH, HAVE YOU WISHED YOU WERE DEAD OR WISHED YOU COULD GO TO SLEEP AND NOT WAKE UP?: NO
6. HAVE YOU EVER DONE ANYTHING, STARTED TO DO ANYTHING, OR PREPARED TO DO ANYTHING TO END YOUR LIFE?: NO
2. HAVE YOU ACTUALLY HAD ANY THOUGHTS OF KILLING YOURSELF?: NO

## 2025-06-16 ASSESSMENT — PAIN SCALES - GENERAL: PAINLEVEL_OUTOF10: 0-NO PAIN

## 2025-06-16 ASSESSMENT — PATIENT HEALTH QUESTIONNAIRE - PHQ9
1. LITTLE INTEREST OR PLEASURE IN DOING THINGS: NOT AT ALL
2. FEELING DOWN, DEPRESSED OR HOPELESS: NOT AT ALL
SUM OF ALL RESPONSES TO PHQ9 QUESTIONS 1 AND 2: 0

## 2025-06-16 NOTE — PROGRESS NOTES
Cancer synopsis:  Rad/onc: Dr. Saucedo/ [facility] (cover today [facility])    69 y/o male w/ Non-small cell carcinoma of right lung, stage 1 (Multi), Clinical: Stage IA2 (cT1b, cN0(f), cM0     NSCLC hx:  Patient has known COPD since last 3 years on inhalers.  Underwent planned evaluation with the PCP.  Surveillance CT for follow up of lung nodule done on 6/3/2024 showed changes of moderate to severe emphysema along with an inrregular nodule in RUL 0.9x1.1 cm, overall unchanged. There was concern for a right lower lobe pneumonia.  Follow-up CT chest on January 17, 2025 confirmed slight increase in size of the right upper lobe lesion now measuring 1.2 cm.  Resolution of the right lower lobe consolidation was noted.  Additionally a stable right middle lobe 4 mm nodule stable since June 2024 and likely benign was described.  A PET/CT was completed on 1/28/2025 as below.  She was then seen by Dr. Unger for discussion regarding role of surgery.  Because of poor PFTs and concerned that surgical margins with wedge resection may not be adequate, surgery was deferred and he was referred for discussion regarding stereotactic radiation.  EBUS was completed, Mateo Alvarez MD 2/19/2025.  SBRT to RUL 03/17/2025         History of presenting illness:    Patient ID: [id number]     Myles Nixon is a 70 y.o. male who presents for Non-small cell carcinoma of right lung, stage 1 (Multi), Clinical: Stage IA2 (cT1b, cN0(f), cM0 now s/p SBRT alone does have a hx of prostate ca.    RT Site: [facility]   RT Date: 03/14/2025  Pulm: Denies CP, SOB outside of typical COPD SOB patient has had for years and or worsening cough, fevers or phlegm production.  Cardiac: Denies palpation or CP  Fatigue: Denies  Bone pain: Denies  Rectal bleeding: Denies  Colonoscopy: none on file due now  Other systems: Denies SOB, CP or fever    Review of systems:  Review of Systems   Constitutional:  Negative for fatigue, fever and unexpected weight change.    Respiratory:  Negative for cough, chest tightness and shortness of breath.    Cardiovascular:  Negative for chest pain, palpitations and leg swelling.   Gastrointestinal:  Negative for abdominal pain, anal bleeding, blood in stool, constipation, diarrhea and rectal pain.   Endocrine: Negative for cold intolerance, heat intolerance and polyuria.   Genitourinary:  Negative for decreased urine volume, difficulty urinating, dysuria, frequency, hematuria and urgency.   Musculoskeletal:  Negative for arthralgias, back pain, gait problem and joint swelling.   Skin: Negative.    Allergic/Immunologic: Negative.    Neurological:  Negative for dizziness, syncope and weakness.   Psychiatric/Behavioral: Negative.         PERFORMANCE STATUS:  KPS/ECO, Fully active, able to carry on all pre-disease performed without restriction (ECOG equivalent 0)    Past Medical history  Medical History[1]     Surgical/family history  Family History[2]   Surgical History[3]     Social History  Tobacco Use: Medium Risk (2025)    Patient History     Smoking Tobacco Use: Former     Smokeless Tobacco Use: Never     Passive Exposure: Not on file         Current med list:  Current Outpatient Medications   Medication Instructions    albuterol 2.5 mg /3 mL (0.083 %) nebulizer solution USE 3 ML VIA NEBULIZER EVERY 8 HOURS AS NEEDED    albuterol 90 mcg/actuation inhaler INHALE 2 PUFFS AS NEEDED EVERY 6 HOURS.    atorvastatin (LIPITOR) 20 mg, oral, Daily    brimonidine (AlphaGAN) 0.2 % ophthalmic solution 1 drop, 2 times daily    budesonide/glycopyr/formoterol (BREZTRI AEROSPHERE INHL) 2 puffs, 2 times daily    cyclobenzaprine (Flexeril) 10 mg tablet 1 tablet, Every 12 hours scheduled (0630,1830)    finasteride (PROSCAR) 5 mg, Daily    ipratropium (Atrovent) 0.02 % nebulizer solution INHALE THE CONTENTS OF 1 VIAL VIA NEBUILZER EVERY 8 HOURS.    ipratropium-albuteroL (Duo-Neb) 0.5-2.5 mg/3 mL nebulizer solution 3 mL    ketorolac (Toradol) 10 mg  "tablet 1 tablet, Every 6 hours PRN    LORazepam (Ativan) 0.5 mg tablet TAKE ONE TABLET BY MOUTH OR UNDER THE TONGUE EVERY 4 HOURS AS NEEDED    pantoprazole (PROTONIX) 40 mg, oral, Daily    promethazine (Phenergan) 25 mg tablet GIVE ONE TABLET BY MOUTH OR RECTALLY EVERY 4 HOURS AS NEEDED FOR NAUSEA OR vomiting    tamsulosin (FLOMAX) 0.4 mg, Nightly    torsemide (DEMADEX) 40 mg, Daily before breakfast    traMADol (Ultram) 50 mg tablet TAKE 1 TABLET BY MOUTH DAILY AS NEEDED for mild pain FOR 90 DAYS    UNABLE TO FIND Brinonidine eye gtts for pressure        Last recorded vital:  /81   Pulse 101   Temp 36.3 °C (97.3 °F) (Temporal)   Resp 18   Wt 99.5 kg (219 lb 4.8 oz)   SpO2 94%   BMI 31.47 kg/m²     Physical Exam  Constitutional:       Appearance: Normal appearance.   Cardiovascular:      Rate and Rhythm: Normal rate.   Pulmonary:      Effort: Pulmonary effort is normal.      Breath sounds: Normal breath sounds.      Comments: On 3 l oxygen at baseline, has portable oxygen machine w/ patient today    Musculoskeletal:         General: Swelling present. Normal range of motion.      Cervical back: Normal range of motion.      Comments: Lower leg dependent edema w/ typical dermatic changes of flaking. Pulses present no pain or other signs of infect     Neurological:      Mental Status: He is alert and oriented to person, place, and time.   Psychiatric:         Mood and Affect: Mood normal.         Behavior: Behavior normal.         Thought Content: Thought content normal.         Judgment: Judgment normal.       Pertinent labs:  No results found for: \"PR1\", \"PSA\", \"TESTOST\", \"CMPLASABS\"    Dx:  Problem List Items Addressed This Visit    None  Visit Diagnoses         Malignant neoplasm of upper lobe, right bronchus or lung    -  Primary    Relevant Orders    Clinic Appointment Request Follow Up; KELI TREVIÑO; [facility] RADON[facility] (Completed)         Imaging:  Discussed most recent imaging showing typical " changes of RUL lesion after RT. Discussed stability of Mid lob sub centimeter lesion and will contoinue to monriot closely on routine imaging FUV. Next in 3m. Reviewed red flag sx of RT pneumonitis including fever, fatigue, cough and or body aches. Will report sooner if these present.    COPD:  Continue w/ pulm for chronic management     PLAN:  FUV 3m  Labs none  Imaging ct chest w/ con  FUV other providers: PCP for routine evals    Please contact office with any concerns:  Rolo Reddy [facility]  [phone]       [1]   Past Medical History:  Diagnosis Date    Atrial fibrillation (Multi)     COPD (chronic obstructive pulmonary disease) (Multi)     CVA (cerebral vascular accident) (Multi)     High cholesterol     On home O2     Personal history of other diseases of the digestive system     History of gastroesophageal reflux (GERD)    Personal history of other diseases of the nervous system and sense organs     History of macular degeneration    Prostate cancer (Multi)    [2]   Family History  Problem Relation Name Age of Onset    Vaginal cancer Mother      Bone cancer Sister      Breast cancer Sister      Breast cancer Sister      Breast cancer Sister      Prostate cancer Brother      Breast cancer Mother's Sister      Breast cancer Maternal Grandmother     [3]   Past Surgical History:  Procedure Laterality Date    OTHER SURGICAL HISTORY  02/11/2019    Biceps tendon rupture repair    OTHER SURGICAL HISTORY  02/11/2019    Leg surgery    OTHER SURGICAL HISTORY  02/11/2019    Cholecystectomy laparoscopic

## 2025-09-04 DIAGNOSIS — Z00.00 WELL ADULT EXAM: ICD-10-CM
